# Patient Record
Sex: FEMALE | Race: WHITE | HISPANIC OR LATINO | Employment: UNEMPLOYED | URBAN - METROPOLITAN AREA
[De-identification: names, ages, dates, MRNs, and addresses within clinical notes are randomized per-mention and may not be internally consistent; named-entity substitution may affect disease eponyms.]

---

## 2018-05-25 ENCOUNTER — HOSPITAL ENCOUNTER (OUTPATIENT)
Facility: MEDICAL CENTER | Age: 23
End: 2018-05-25
Attending: NURSE PRACTITIONER

## 2018-05-25 ENCOUNTER — INITIAL PRENATAL (OUTPATIENT)
Dept: OBGYN | Facility: CLINIC | Age: 23
End: 2018-05-25
Payer: MEDICAID

## 2018-05-25 VITALS
HEIGHT: 61 IN | SYSTOLIC BLOOD PRESSURE: 112 MMHG | BODY MASS INDEX: 31.91 KG/M2 | DIASTOLIC BLOOD PRESSURE: 68 MMHG | WEIGHT: 169 LBS

## 2018-05-25 DIAGNOSIS — Z34.90 ENCOUNTER FOR SUPERVISION OF NORMAL PREGNANCY, ANTEPARTUM, UNSPECIFIED GRAVIDITY: ICD-10-CM

## 2018-05-25 DIAGNOSIS — Z34.02 ENCOUNTER FOR SUPERVISION OF NORMAL FIRST PREGNANCY IN SECOND TRIMESTER: ICD-10-CM

## 2018-05-25 DIAGNOSIS — Z34.02 ENCOUNTER FOR SUPERVISION OF NORMAL FIRST PREGNANCY IN SECOND TRIMESTER: Primary | ICD-10-CM

## 2018-05-25 LAB
APPEARANCE UR: NORMAL
BILIRUB UR STRIP-MCNC: NORMAL MG/DL
COLOR UR AUTO: NORMAL
GLUCOSE UR STRIP.AUTO-MCNC: NORMAL MG/DL
KETONES UR STRIP.AUTO-MCNC: NEGATIVE MG/DL
LEUKOCYTE ESTERASE UR QL STRIP.AUTO: NEGATIVE
NITRITE UR QL STRIP.AUTO: NEGATIVE
PH UR STRIP.AUTO: 7 [PH] (ref 5–8)
PROT UR QL STRIP: NORMAL MG/DL
RBC UR QL AUTO: NEGATIVE
SP GR UR STRIP.AUTO: 1.02
UROBILINOGEN UR STRIP-MCNC: NORMAL MG/DL

## 2018-05-25 PROCEDURE — 59401 PR NEW OB VISIT: CPT | Performed by: NURSE PRACTITIONER

## 2018-05-25 PROCEDURE — 87591 N.GONORRHOEAE DNA AMP PROB: CPT

## 2018-05-25 PROCEDURE — 81002 URINALYSIS NONAUTO W/O SCOPE: CPT | Performed by: NURSE PRACTITIONER

## 2018-05-25 PROCEDURE — 87491 CHLMYD TRACH DNA AMP PROBE: CPT

## 2018-05-25 NOTE — PROGRESS NOTES
"Subjective:      Sugar Ding is a 22 y.o. female who presents with No chief complaint on file.            HPI    ROS       Objective:     /68   Ht 1.549 m (5' 1\")   Wt 76.7 kg (169 lb)   LMP 2018   BMI 31.93 kg/m²      Physical Exam   Constitutional: She appears well-developed and well-nourished.   HENT:   Head: Normocephalic.   Eyes: Pupils are equal, round, and reactive to light.   Neck: Normal range of motion. No thyromegaly present.   Cardiovascular: Normal rate, regular rhythm and normal heart sounds.    Pulmonary/Chest: Effort normal and breath sounds normal.   Abdominal: Soft. Bowel sounds are normal.   Genitourinary: Vagina normal and uterus normal.   Neurological: She is alert.   Skin: Skin is warm and dry.   Psychiatric: She has a normal mood and affect. Her behavior is normal. Judgment and thought content normal.   Nursing note and vitals reviewed.              Assessment/Plan:     1. Encounter for supervision of normal first pregnancy in second trimester    - POCT Urinalysis  - PREG CNTR PRENATAL PN; Future  - CHLAMYDIA/GC PCR URINE OR SWAB; Future  - US-OB 2ND 3RD TRI COMPLETE; Future  - AFP TETRA; Future    2. Encounter for supervision of normal pregnancy, antepartum, unspecified       "

## 2018-05-25 NOTE — LETTER
Cystic Fibrosis Carrier Testing  Sugar Ding    The following information is about a blood test that can be done to determine if you and/or your partner carry the gene for cystic fibrosis.    WHAT IS CYSTIC FIBROSIS?  · Cystic fibrosis (CF) is an inherited disease that affects more than 25,000 American children and young adults.  · Symptoms of CF vary but include lung congestion, pneumonia, diarrhea and poor growth.  Most people with CF have severe medical problems and some die at a young age.  Others have so few symptoms they are unaware they have CF.  · CF does not affect intelligence.  · Although there is no cure for CF at this time, scientists are making progress in improving treatment and in searching for a cure.  In the past many people with CF  at a very young age.  Today, many are living into their 20’s and 30’s.    IS THERE A CHANCE MY BABY COULD HAVE CYSTIC FIBROSIS?  · You can have a child with CF even if there is no history in your family (see chart below).  · CF testing can help determine if you are a carrier and at risk to have a child with CF.  Note: if both parents are carriers, there is a 1 in 4 (25%) chance with each pregnancy that they will have a child with CF.  · Carriers have one normal CF gene and one altered CF gene.  · People with CF have two altered CF genes.  · Most people have two normal copies of the CF gene.    Approximate risk that a couple with no family history of cystic fibrosis will have a child with cystic fibrosis:    Ethnic background / Risk     couple:  1 in 2,500   couple:  1 in 15,000            couple:  1 in 8,000     American couple:  1 in 32,000     WHAT TESTING IS AVAILABLE?  · There is a blood test that can be done to find out if you or your partner is a carrier.  · It is important to understand that CF carrier testing does not detect all CF carriers.  · If the test shows that you are both CF carriers, you unborn baby can  be tested to find out if the baby has CF.    HOW MUCH DOES IT COST TO HAVE CYSTIC FIBROSIS CARRIER TESTING?  · Cost and insurance coverage for CF carrier testing vary depending upon the laboratory used and your insurance policy.  · The average cost for CF carrier testing is $300 per person.  · Your genetic counselor can provide you with more information about cystic fibrosis carrier testing.    _____  Yes, I am interested in discussing carrier testing with a genetic counselor.    _____  No, I am not interested in CF carrier testing or in receiving more information about CF carrier testing.      Client signature: ________________________________________  5/25/2018

## 2018-05-25 NOTE — PROGRESS NOTES
"S:  Sugar Ding is a 22 y.o.  who presents for her new OB exam.  She is 20w4d with and JACQUELINE of Estimated Date of Delivery: 10/8/18 by unsure LMP.  She has no complaints. No significant n/v.  She is currently not working. No heavy lifting or chemical exposure. No ER visits or previous care in this pregnancy.     desires AFP.  declines CF.  Denies VB, LOF, or cramping.  Denies dysuria, vaginal DC. Reports probable fetal movement.     Pt is single and lives with family.  Pregnancy is desired.      History reviewed. No pertinent past medical history.  Family History   Problem Relation Age of Onset   • No Known Problems Mother    • No Known Problems Father    • No Known Problems Sister    • No Known Problems Brother      Social History     Social History   • Marital status: Single     Spouse name: N/A   • Number of children: N/A   • Years of education: N/A     Occupational History   • Not on file.     Social History Main Topics   • Smoking status: Never Smoker   • Smokeless tobacco: Never Used   • Alcohol use No   • Drug use: No   • Sexual activity: Yes     Partners: Male     Birth control/ protection: Condom     Other Topics Concern   • Not on file     Social History Narrative   • No narrative on file     OB History    Para Term  AB Living   1             SAB TAB Ectopic Molar Multiple Live Births                    # Outcome Date GA Lbr Randolph/2nd Weight Sex Delivery Anes PTL Lv   1 Current                   History of HSV I or II in self or partner: no  History of Thyroid problems: no    O:    Vitals:    18 1022   BP: 112/68   Weight: 76.7 kg (169 lb)   Height: 1.549 m (5' 1\")      See Prenatal Physical.    Wet mount: none      A:   1.  IUP @ 20w4d per unsure LMP        2.  S=D        3.  See problem list below               Patient Active Problem List    Diagnosis Date Noted   • Supervision of normal pregnancy 2018         P:  1.  GC/CT done. No pap due age         2.  Prenatal labs " ordered - lab slip given including AFP        3.  Discussed PNV, diet, avoidances and adequate water intake        4.  NOB packet given        5.  Return to office in 4 wks        6.  Complete OB US first available.            No orders of the defined types were placed in this encounter.

## 2018-05-25 NOTE — PROGRESS NOTES
Pt. Here for NOB visit today.  Good   # [phone]  First prenatal care  Pt. States having some nausea.   Pharmacy verified.    AFP lab slip given today along with instructions.

## 2018-05-26 LAB
C TRACH DNA SPEC QL NAA+PROBE: NEGATIVE
N GONORRHOEA DNA SPEC QL NAA+PROBE: NEGATIVE
SPECIMEN SOURCE: NORMAL

## 2018-06-12 ENCOUNTER — APPOINTMENT (OUTPATIENT)
Dept: RADIOLOGY | Facility: IMAGING CENTER | Age: 23
End: 2018-06-12
Attending: NURSE PRACTITIONER
Payer: MEDICAID

## 2018-06-12 DIAGNOSIS — Z34.02 ENCOUNTER FOR SUPERVISION OF NORMAL FIRST PREGNANCY IN SECOND TRIMESTER: ICD-10-CM

## 2018-06-12 PROCEDURE — 76805 OB US >/= 14 WKS SNGL FETUS: CPT | Performed by: OBSTETRICS & GYNECOLOGY

## 2018-06-25 ENCOUNTER — ROUTINE PRENATAL (OUTPATIENT)
Dept: OBGYN | Facility: CLINIC | Age: 23
End: 2018-06-25

## 2018-06-25 VITALS — WEIGHT: 170 LBS | SYSTOLIC BLOOD PRESSURE: 122 MMHG | BODY MASS INDEX: 32.12 KG/M2 | DIASTOLIC BLOOD PRESSURE: 60 MMHG

## 2018-06-25 DIAGNOSIS — Z34.00 SUPERVISION OF NORMAL FIRST PREGNANCY, ANTEPARTUM: Primary | ICD-10-CM

## 2018-06-25 PROCEDURE — 90040 PR PRENATAL FOLLOW UP: CPT | Performed by: NURSE PRACTITIONER

## 2018-06-25 PROCEDURE — 90715 TDAP VACCINE 7 YRS/> IM: CPT | Performed by: NURSE PRACTITIONER

## 2018-06-25 PROCEDURE — 90471 IMMUNIZATION ADMIN: CPT | Performed by: NURSE PRACTITIONER

## 2018-06-25 NOTE — PROGRESS NOTES
Pt. Here for OB/F/U today Kick Count sheet given and explained to pt.   Good FM  Good # [phone]  Pt states no complaints.   Pharmacy verified.   Pt states will do PNP some time this week along with 1 HR GTT.  Tdap vaccine given today, right deltoid. Screening checklist reviewed with pt verified by Opal Candelario C.N.M.

## 2018-06-25 NOTE — LETTER
"Count Your Baby's Movements  Another step to a healthy delivery    Sugar Ding             Dept: [phone]    How Many Weeks Pregnant 27w3d    Date to Begin Countin18              How to use this chart    One way for your physician to keep track of your baby's health is by knowing how often the baby moves (or \"kicks\") in your womb.  You can help your physician to do this by using this chart every day.    Every day, you should see how many hours it takes for your baby to move 10 times.  Start in the morning, as soon as you get up.    · First, write down the time your baby moves until you get to 10.  · Check off one box every time your baby moves until you get to 10.  · Write down the time you finished counting in the last column.  · Total how long it took to count up all 10 movements.  · Finally, fill in the box that shows how long this took.  After counting 10 movements, you no longer have to count any more that day.  The next morning, just start counting again as soon as you get up.    What should you call a \"movement\"?  It is hard to say, because it will feel different from one mother to another and from one pregnancy to the next.  The important thing is that you count the movements the same way throughout your pregnancy.  If you have more questions, you should ask your physician.    Count carefully every day!  SAMPLE:  Week 28    How many hours did it take to feel 10 movements?       Start  Time     1     2     3     4     5     6     7     8     9     10   Finish Time   Mon 8:20 ·  ·  ·  ·  ·  ·  ·  ·  ·  ·  11:40                  Sat               Sun                 IMPORTANT: You should contact your physician if it takes more than two hours for you to feel 10 movements.  Each morning, write down the time and start to count the movements of your baby.  Keep track by checking off one box every time you feel one movement.  When you have " "felt 10 \"kicks\", write down the time you finished counting in the last column.  Then fill in the   box (over the check bethany) for the number of hours it took.  Be sure to read the complete instructions on the previous page.            "

## 2018-06-25 NOTE — PROGRESS NOTES
SUBJECTIVE:  Pt is a 23 y.o.   at 27w3d  gestation. Presents today for follow-up prenatal care. Reports no issues at this time.  Reports good  fetal movement. Denies cramping/contractions, bleeding or leaking of fluid. Denies dysuria, headaches, N/V, or other issues at this time. Generally feels well today.     OBJECTIVE:  - See prenatal vitals flow  -   Vitals:    18 1022   BP: 122/60   Weight: 77.1 kg (170 lb)      Labs - not done  US - normal fetal survey            ASSESSMENT:   - IUP at 27w3d    - S=D   -   Patient Active Problem List    Diagnosis Date Noted   • Supervision of normal pregnancy 2018         PLAN:  - S/sx pregnancy and labor warning signs vs general discomforts discussed  - Fetal movements and kick counts reviewed   - Adequate hydration reinforced  - Nutrition/exercise/vitamin education; continued PNV  - lab slips with instructions  TDAP today.

## 2018-06-27 ENCOUNTER — HOSPITAL ENCOUNTER (OUTPATIENT)
Dept: LAB | Facility: MEDICAL CENTER | Age: 23
End: 2018-06-27
Attending: NURSE PRACTITIONER
Payer: COMMERCIAL

## 2018-06-27 DIAGNOSIS — Z34.00 SUPERVISION OF NORMAL FIRST PREGNANCY, ANTEPARTUM: ICD-10-CM

## 2018-06-27 DIAGNOSIS — Z34.02 ENCOUNTER FOR SUPERVISION OF NORMAL FIRST PREGNANCY IN SECOND TRIMESTER: ICD-10-CM

## 2018-06-27 LAB
ABO GROUP BLD: NORMAL
APPEARANCE UR: ABNORMAL
BACTERIA #/AREA URNS HPF: ABNORMAL /HPF
BASOPHILS # BLD AUTO: 0.3 % (ref 0–1.8)
BASOPHILS # BLD: 0.04 K/UL (ref 0–0.12)
BILIRUB UR QL STRIP.AUTO: NEGATIVE
BLD GP AB SCN SERPL QL: NORMAL
COLOR UR: YELLOW
EOSINOPHIL # BLD AUTO: 0.06 K/UL (ref 0–0.51)
EOSINOPHIL NFR BLD: 0.5 % (ref 0–6.9)
EPI CELLS #/AREA URNS HPF: ABNORMAL /HPF
ERYTHROCYTE [DISTWIDTH] IN BLOOD BY AUTOMATED COUNT: 44.2 FL (ref 35.9–50)
GLUCOSE 1H P 50 G GLC PO SERPL-MCNC: 119 MG/DL (ref 70–139)
GLUCOSE UR STRIP.AUTO-MCNC: 250 MG/DL
HBV SURFACE AG SER QL: NEGATIVE
HCT VFR BLD AUTO: 40 % (ref 37–47)
HGB BLD-MCNC: 13.2 G/DL (ref 12–16)
HIV 1+2 AB+HIV1 P24 AG SERPL QL IA: NON REACTIVE
HYALINE CASTS #/AREA URNS LPF: ABNORMAL /LPF
IMM GRANULOCYTES # BLD AUTO: 0.08 K/UL (ref 0–0.11)
IMM GRANULOCYTES NFR BLD AUTO: 0.7 % (ref 0–0.9)
KETONES UR STRIP.AUTO-MCNC: NEGATIVE MG/DL
LEUKOCYTE ESTERASE UR QL STRIP.AUTO: ABNORMAL
LYMPHOCYTES # BLD AUTO: 1.51 K/UL (ref 1–4.8)
LYMPHOCYTES NFR BLD: 12.9 % (ref 22–41)
MCH RBC QN AUTO: 29.7 PG (ref 27–33)
MCHC RBC AUTO-ENTMCNC: 33 G/DL (ref 33.6–35)
MCV RBC AUTO: 90.1 FL (ref 81.4–97.8)
MICRO URNS: ABNORMAL
MONOCYTES # BLD AUTO: 0.71 K/UL (ref 0–0.85)
MONOCYTES NFR BLD AUTO: 6.1 % (ref 0–13.4)
NEUTROPHILS # BLD AUTO: 9.32 K/UL (ref 2–7.15)
NEUTROPHILS NFR BLD: 79.5 % (ref 44–72)
NITRITE UR QL STRIP.AUTO: NEGATIVE
NRBC # BLD AUTO: 0 K/UL
NRBC BLD-RTO: 0 /100 WBC
PH UR STRIP.AUTO: 7 [PH]
PLATELET # BLD AUTO: 254 K/UL (ref 164–446)
PMV BLD AUTO: 11.3 FL (ref 9–12.9)
PROT UR QL STRIP: NEGATIVE MG/DL
RBC # BLD AUTO: 4.44 M/UL (ref 4.2–5.4)
RBC UR QL AUTO: NEGATIVE
RH BLD: NORMAL
RUBV AB SER QL: 9.6 IU/ML
SP GR UR STRIP.AUTO: 1.02
TREPONEMA PALLIDUM IGG+IGM AB [PRESENCE] IN SERUM OR PLASMA BY IMMUNOASSAY: NON REACTIVE
UROBILINOGEN UR STRIP.AUTO-MCNC: 0.2 MG/DL
WBC # BLD AUTO: 11.7 K/UL (ref 4.8–10.8)
WBC #/AREA URNS HPF: ABNORMAL /HPF

## 2018-06-28 PROBLEM — O09.899 RUBELLA NON-IMMUNE STATUS, ANTEPARTUM: Status: ACTIVE | Noted: 2018-06-28

## 2018-06-28 PROBLEM — Z28.39 RUBELLA NON-IMMUNE STATUS, ANTEPARTUM: Status: ACTIVE | Noted: 2018-06-28
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
## 2018-07-09 ENCOUNTER — ROUTINE PRENATAL (OUTPATIENT)
Dept: OBGYN | Facility: CLINIC | Age: 23
End: 2018-07-09
Payer: MEDICAID

## 2018-07-09 VITALS — SYSTOLIC BLOOD PRESSURE: 127 MMHG | DIASTOLIC BLOOD PRESSURE: 75 MMHG | WEIGHT: 172 LBS | BODY MASS INDEX: 32.5 KG/M2

## 2018-07-09 DIAGNOSIS — Z34.90 ENCOUNTER FOR SUPERVISION OF NORMAL PREGNANCY, ANTEPARTUM, UNSPECIFIED GRAVIDITY: Primary | ICD-10-CM

## 2018-07-09 DIAGNOSIS — O09.899 RUBELLA NON-IMMUNE STATUS, ANTEPARTUM: ICD-10-CM

## 2018-07-09 DIAGNOSIS — Z28.39 RUBELLA NON-IMMUNE STATUS, ANTEPARTUM: ICD-10-CM

## 2018-07-09 PROCEDURE — 90040 PR PRENATAL FOLLOW UP: CPT | Performed by: PHYSICIAN ASSISTANT

## 2018-07-09 NOTE — LETTER
"Count Your Baby's Movements  Another step to a healthy delivery    Sugar Chatterjee             Dept: [phone]    How Many Weeks Pregnant? 29W3D    Date to Begin Counting: Today 07/09/2018              How to use this chart    One way for your physician to keep track of your baby's health is by knowing how often the baby moves (or \"kicks\") in your womb.  You can help your physician to do this by using this chart every day.    Every day, you should see how many hours it takes for your baby to move 10 times.  Start in the morning, as soon as you get up.    · First, write down the time your baby moves until you get to 10.  · Check off one box every time your baby moves until you get to 10.  · Write down the time you finished counting in the last column.  · Total how long it took to count up all 10 movements.  · Finally, fill in the box that shows how long this took.  After counting 10 movements, you no longer have to count any more that day.  The next morning, just start counting again as soon as you get up.    What should you call a \"movement\"?  It is hard to say, because it will feel different from one mother to another and from one pregnancy to the next.  The important thing is that you count the movements the same way throughout your pregnancy.  If you have more questions, you should ask your physician.    Count carefully every day!  SAMPLE:  Week 28    How many hours did it take to feel 10 movements?       Start  Time     1     2     3     4     5     6     7     8     9     10   Finish Time   Mon 8:20 ·  ·  ·  ·  ·  ·  ·  ·  ·  ·  11:40   Tue Wed Thu Fri               Sat               Sun                 IMPORTANT: You should contact your physician if it takes more than two hours for you to feel 10 movements.  Each morning, write down the time and start to count the movements of your baby.  Keep track by checking off one box every time you feel one movement.  When you " "have felt 10 \"kicks\", write down the time you finished counting in the last column.  Then fill in the   box (over the check bethany) for the number of hours it took.  Be sure to read the complete instructions on the previous page.            "

## 2018-07-09 NOTE — LETTER
"Count Your Baby's Movements  Another step to a healthy delivery    [facility]             Dept: [phone]    How Many Weeks Pregnant? 29w3d    Date to Begin Counting: today              How to use this chart    One way for your physician to keep track of your baby's health is by knowing how often the baby moves (or \"kicks\") in your womb.  You can help your physician to do this by using this chart every day.    Every day, you should see how many hours it takes for your baby to move 10 times.  Start in the morning, as soon as you get up.    · First, write down the time your baby moves until you get to 10.  · Check off one box every time your baby moves until you get to 10.  · Write down the time you finished counting in the last column.  · Total how long it took to count up all 10 movements.  · Finally, fill in the box that shows how long this took.  After counting 10 movements, you no longer have to count any more that day.  The next morning, just start counting again as soon as you get up.    What should you call a \"movement\"?  It is hard to say, because it will feel different from one mother to another and from one pregnancy to the next.  The important thing is that you count the movements the same way throughout your pregnancy.  If you have more questions, you should ask your physician.    Count carefully every day!  SAMPLE:  Week 28    How many hours did it take to feel 10 movements?       Start  Time     1     2     3     4     5     6     7     8     9     10   Finish Time   Mon 8:20 ·  ·  ·  ·  ·  ·  ·  ·  ·  ·  11:40   Tue Wed Thu Fri               Sat               Sun                 IMPORTANT: You should contact your physician if it takes more than two hours for you to feel 10 movements.  Each morning, write down the time and start to count the movements of your baby.  Keep track by checking off one box every time you feel one movement.  When you have " "felt 10 \"kicks\", write down the time you finished counting in the last column.  Then fill in the   box (over the check bethany) for the number of hours it took.  Be sure to read the complete instructions on the previous page.            "

## 2018-07-23 ENCOUNTER — ROUTINE PRENATAL (OUTPATIENT)
Dept: OBGYN | Facility: CLINIC | Age: 23
End: 2018-07-23

## 2018-07-23 VITALS — SYSTOLIC BLOOD PRESSURE: 128 MMHG | WEIGHT: 174 LBS | DIASTOLIC BLOOD PRESSURE: 68 MMHG | BODY MASS INDEX: 32.88 KG/M2

## 2018-07-23 DIAGNOSIS — Z34.00 SUPERVISION OF NORMAL FIRST PREGNANCY, ANTEPARTUM: Primary | ICD-10-CM

## 2018-07-23 DIAGNOSIS — Z28.39 RUBELLA NON-IMMUNE STATUS, ANTEPARTUM: ICD-10-CM

## 2018-07-23 DIAGNOSIS — O09.899 RUBELLA NON-IMMUNE STATUS, ANTEPARTUM: ICD-10-CM

## 2018-07-23 PROCEDURE — 90040 PR PRENATAL FOLLOW UP: CPT | Performed by: NURSE PRACTITIONER

## 2018-07-23 NOTE — PROGRESS NOTES
OB f/u. + fetal movement.  No VB, LOF or UC's.  Good phone # [phone]  Preferred pharmacy confirmed  Pt denies any problems at this time

## 2018-07-23 NOTE — PROGRESS NOTES
S) Pt is a 23 y.o.   at 31w3d  gestation. Routine prenatal care today. No complaints today. Discussed uterine size discrepancy, and need for US if next measurement is also greater than expected.  labor precautions discussed, all questions answered.    Fetal movement Normal  Cramping no  VB no  LOF no   Denies dysuria. Generally feels well today. Good self-care activities identified. Denies headaches, swelling, visual changes, or epigastric pain .     O) Blood pressure 128/68, weight 78.9 kg (174 lb), last menstrual period 2018.        Labs:       PNL: WNL       GCT: 119       AFP: Not Examined       GBS: N/A       Pertinent ultrasound -        18- Survey WNL, CLARE 19.20cm, JACQUELINE changed based on this US as it was >2weeks different from LMP. Patient aware of changed JACQUELINE    A) IUP at 31w3d       S>D, consider US next visit if still larger than expected         Patient Active Problem List    Diagnosis Date Noted   • Rubella equivocal  2018   • Supervision of normal pregnancy 2018          SVE: deferred         TDAP: yes       FLU: no        BTL: no       : n/a       C/S Consent: n/a       IOL or C/S scheduled: no       LAST PAP: deferred due to age, will do PP         P) s/s ptl vs general discomforts. Fetal movements reviewed. General ed and anticipatory guidance. Nutrition/exercise/vitamin. Plans breast Plans pp contraception- unsure  Continue PNV.

## 2018-08-06 ENCOUNTER — ROUTINE PRENATAL (OUTPATIENT)
Dept: OBGYN | Facility: CLINIC | Age: 23
End: 2018-08-06

## 2018-08-06 VITALS — BODY MASS INDEX: 33.63 KG/M2 | DIASTOLIC BLOOD PRESSURE: 75 MMHG | WEIGHT: 178 LBS | SYSTOLIC BLOOD PRESSURE: 137 MMHG

## 2018-08-06 DIAGNOSIS — Z28.39 RUBELLA NON-IMMUNE STATUS, ANTEPARTUM: ICD-10-CM

## 2018-08-06 DIAGNOSIS — O09.899 RUBELLA NON-IMMUNE STATUS, ANTEPARTUM: ICD-10-CM

## 2018-08-06 PROCEDURE — 90040 PR PRENATAL FOLLOW UP: CPT | Performed by: PHYSICIAN ASSISTANT

## 2018-08-06 NOTE — PROGRESS NOTES
S:  Pt is  at 33w3d for Centering session #5.  Pt has no complaints.  Reports good FM.  Denies VB, LOF, RUCs or vaginal DC.    O:  Please see above vitals.        FHTs: 160        Fundal ht: 34        1hr      A:  IUP at 33w3d  Patient Active Problem List    Diagnosis Date Noted   • Rubella equivocal  2018   • Supervision of normal pregnancy 2018        P:  1.  Declines BTL.          2.  Questions answered.          3.  Encouraged adequate water intake        4.  F/u 2wks Centering Session #6        5.  Continue FKCs.    Centering Topics Reviewed:  1.  Labor  2.  Birth facility  3.  Breathing  4.  Medications for labor & birth  5.  Early labor -- when to call  6.  Kick counts

## 2018-08-20 ENCOUNTER — ROUTINE PRENATAL (OUTPATIENT)
Dept: OBGYN | Facility: CLINIC | Age: 23
End: 2018-08-20

## 2018-08-20 ENCOUNTER — HOSPITAL ENCOUNTER (OUTPATIENT)
Facility: MEDICAL CENTER | Age: 23
End: 2018-08-20
Attending: NURSE PRACTITIONER
Payer: COMMERCIAL

## 2018-08-20 VITALS — SYSTOLIC BLOOD PRESSURE: 120 MMHG | WEIGHT: 182 LBS | BODY MASS INDEX: 34.39 KG/M2 | DIASTOLIC BLOOD PRESSURE: 83 MMHG

## 2018-08-20 DIAGNOSIS — O09.899 RUBELLA NON-IMMUNE STATUS, ANTEPARTUM: ICD-10-CM

## 2018-08-20 DIAGNOSIS — Z28.39 RUBELLA NON-IMMUNE STATUS, ANTEPARTUM: ICD-10-CM

## 2018-08-20 DIAGNOSIS — Z34.03 ENCOUNTER FOR SUPERVISION OF NORMAL FIRST PREGNANCY IN THIRD TRIMESTER: ICD-10-CM

## 2018-08-20 DIAGNOSIS — Z34.03 ENCOUNTER FOR SUPERVISION OF NORMAL FIRST PREGNANCY IN THIRD TRIMESTER: Primary | ICD-10-CM

## 2018-08-20 PROCEDURE — 90040 PR PRENATAL FOLLOW UP: CPT | Performed by: NURSE PRACTITIONER

## 2018-08-20 NOTE — PROGRESS NOTES
S:  Pt is  at 35w3d for Centering session #6.  No c/o today.  Reports good FM.  Denies VB, LOF, RUCs or vaginal DC.    O:  Please see above vitals.        FHTs: 147        Fundal ht: 36          A:  IUP at 35w3d  Patient Active Problem List    Diagnosis Date Noted   • Rubella equivocal  2018   • Supervision of normal pregnancy 2018        P:  1.  Questions answered.          2.  Encouraged adequate water intake        3.  F/u 2wks Centering Session #7        4.  Continue FKCs.          5.  PP contraception pills        6.  GBS obtained.      Centering Topics Reviewed:  1.  The Birth Experience

## 2018-08-20 NOTE — PATIENT INSTRUCTIONS
P:  1.  Questions answered.          2.  Encouraged adequate water intake        3.  F/u 2wks Centering Session #7        4.  Continue FKCs.          5.  PP contraception pills        6.  GBS obtained.      Centering Topics Reviewed:  1.  The Birth Experience

## 2018-08-22 LAB — GP B STREP DNA SPEC QL NAA+PROBE: NEGATIVE

## 2018-08-29 NOTE — PROGRESS NOTES
S:  Pt is  at 37w4d for Centering session #7.  Pt has no complaints, though has felt more pressure in lower abd.  Reports good FM.  Denies VB, LOF, RUCs or vaginal DC. Pt denies HA, blurred vision or edema.   O:  Please see above vitals.        FHTs: 142        Fundal ht: 38        BP: 146/92, repeat 132/84, US: Neg protein, no edema    A:  IUP at 37w4d  Patient Active Problem List    Diagnosis Date Noted   • Rubella equivocal  2018   • Supervision of normal pregnancy 2018        P:  1.  Questions answered.          2.  Encouraged adequate water intake        3.  GBS @ 35wks - neg, pt notified of results        4.  F/u 2wks Centering Session #8, 1 wk ob f/u    Centering Topics Reviewed:  1.  The 's first days  2.  Planning pediatric care  3.  Caring for your baby  4.  Circumcision  5.  Brothers & sisters  6.   -- when to call  7.  Other topics: Pain control in labor

## 2018-09-04 ENCOUNTER — ROUTINE PRENATAL (OUTPATIENT)
Dept: OBGYN | Facility: CLINIC | Age: 23
End: 2018-09-04

## 2018-09-04 VITALS — BODY MASS INDEX: 35.71 KG/M2 | SYSTOLIC BLOOD PRESSURE: 132 MMHG | DIASTOLIC BLOOD PRESSURE: 84 MMHG | WEIGHT: 189 LBS

## 2018-09-04 DIAGNOSIS — O09.899 RUBELLA NON-IMMUNE STATUS, ANTEPARTUM: ICD-10-CM

## 2018-09-04 DIAGNOSIS — R03.0 ELEVATED BP WITHOUT DIAGNOSIS OF HYPERTENSION: ICD-10-CM

## 2018-09-04 DIAGNOSIS — Z28.39 RUBELLA NON-IMMUNE STATUS, ANTEPARTUM: ICD-10-CM

## 2018-09-04 LAB
APPEARANCE UR: NORMAL
BILIRUB UR STRIP-MCNC: NORMAL MG/DL
COLOR UR AUTO: YELLOW
GLUCOSE UR STRIP.AUTO-MCNC: NEGATIVE MG/DL
KETONES UR STRIP.AUTO-MCNC: NEGATIVE MG/DL
LEUKOCYTE ESTERASE UR QL STRIP.AUTO: NORMAL
NITRITE UR QL STRIP.AUTO: NEGATIVE
PH UR STRIP.AUTO: 6.5 [PH] (ref 5–8)
PROT UR QL STRIP: NEGATIVE MG/DL
RBC UR QL AUTO: NEGATIVE
SP GR UR STRIP.AUTO: 1.01
UROBILINOGEN UR STRIP-MCNC: NORMAL MG/DL

## 2018-09-04 PROCEDURE — 90040 PR PRENATAL FOLLOW UP: CPT | Performed by: PHYSICIAN ASSISTANT

## 2018-09-04 PROCEDURE — 81002 URINALYSIS NONAUTO W/O SCOPE: CPT | Performed by: PHYSICIAN ASSISTANT

## 2018-09-14 ENCOUNTER — ROUTINE PRENATAL (OUTPATIENT)
Dept: OBGYN | Facility: CLINIC | Age: 23
End: 2018-09-14

## 2018-09-14 VITALS — WEIGHT: 195 LBS | SYSTOLIC BLOOD PRESSURE: 130 MMHG | DIASTOLIC BLOOD PRESSURE: 78 MMHG | BODY MASS INDEX: 36.84 KG/M2

## 2018-09-14 DIAGNOSIS — Z34.03 ENCOUNTER FOR SUPERVISION OF NORMAL FIRST PREGNANCY IN THIRD TRIMESTER: Primary | ICD-10-CM

## 2018-09-14 DIAGNOSIS — Z28.39 RUBELLA NON-IMMUNE STATUS, ANTEPARTUM: ICD-10-CM

## 2018-09-14 DIAGNOSIS — O09.899 RUBELLA NON-IMMUNE STATUS, ANTEPARTUM: ICD-10-CM

## 2018-09-14 PROCEDURE — 90040 PR PRENATAL FOLLOW UP: CPT | Performed by: NURSE PRACTITIONER

## 2018-09-14 NOTE — PROGRESS NOTES
S) Pt is a 23 y.o.   at 39w0d  gestation. Routine prenatal care today. Pt denies complaints other than some pelvic pressure.  Reassurance provided.    Fetal movement Normal  Cramping no  VB no  LOF no   Denies dysuria. Generally feels well today. Good self-care activities identified. Denies headaches, swelling, visual changes, or epigastric pain .     O) Blood pressure 130/78, weight 88.5 kg (195 lb), last menstrual period 2018.        Labs:       PNL: WNL       GCT: 119       AFP: Not Examined       GBS: negative       Pertinent ultrasound - 18 survey WNL, CLARE 19.2, consistent with previous dating           A) IUP at 39w0d       S=D         Patient Active Problem List    Diagnosis Date Noted   • Rubella equivocal  2018   • Supervision of normal pregnancy 2018          SVE: pt refused         TDAP: yes       FLU: no        BTL: no       : no       C/S Consent: no       IOL or C/S scheduled: yes - referral placed       LAST PAP: will need first one PP         P) Reviewed labour precautions   Fetal movements reviewed. General ed and anticipatory guidance. Nutrition/exercise/vitamin.   Plans breast Plans pp contraception- unsure    Discussed GBS results  Discussed practice IOL rec at 41 weeks  RTC 1 week or PRN.

## 2018-09-14 NOTE — PROGRESS NOTES
Pt here today for OB follow up  Pt states she is having pelvic pain  Reports +FM  Good # [phone]  Pharmacy Confirmed.  Chaperone offered and not idicated. .  GBS negative.

## 2018-09-16 NOTE — PROGRESS NOTES
S:  Pt is  at 39w3d for Centering session #8.  Reports occ UC.  Reports good FM.  Denies VB, LOF, RUCs or vaginal DC.    O:  Please see above vitals.        FHTs: 140        Fundal ht: 38        Fetal position: vertex        SVE: cl/th/-2        GBS neg on 18.          A:  IUP at 39w3d  Patient Active Problem List    Diagnosis Date Noted   • Rubella equivocal  2018   • Supervision of normal pregnancy 2018        P:  1.  Questions answered.          2.  Encouraged adequate water intake        3.  Reviewed GBS status w pt.        4.  F/u 1wk LYNNE & 2wks Centering Session #9/10        5.  IOL pending.         6.  PP contraception: depo-provera.     Centering Topics Reviewed:  1.  Pregnancy to parenting transition  2.  Emotional adjustments  3.  Postpartum depression  4.  Pregnancy -- when to call  5.  Breastfeeding.    Chaperone offered and provided by Sridevi Landaverde MA.

## 2018-09-17 ENCOUNTER — ROUTINE PRENATAL (OUTPATIENT)
Dept: OBGYN | Facility: CLINIC | Age: 23
End: 2018-09-17

## 2018-09-17 VITALS — SYSTOLIC BLOOD PRESSURE: 138 MMHG | WEIGHT: 196 LBS | BODY MASS INDEX: 37.03 KG/M2 | DIASTOLIC BLOOD PRESSURE: 76 MMHG

## 2018-09-17 DIAGNOSIS — Z34.03 ENCOUNTER FOR SUPERVISION OF NORMAL FIRST PREGNANCY IN THIRD TRIMESTER: Primary | ICD-10-CM

## 2018-09-17 DIAGNOSIS — O09.899 RUBELLA NON-IMMUNE STATUS, ANTEPARTUM: ICD-10-CM

## 2018-09-17 DIAGNOSIS — Z28.39 RUBELLA NON-IMMUNE STATUS, ANTEPARTUM: ICD-10-CM

## 2018-09-17 PROCEDURE — 90040 PR PRENATAL FOLLOW UP: CPT | Performed by: NURSE PRACTITIONER

## 2018-09-17 NOTE — PATIENT INSTRUCTIONS
P:  1.  Questions answered.          2.  Encouraged adequate water intake        3.  Reviewed GBS status w pt.        4.  F/u 1wk LYNNE & 2wks Centering Session #9/10        5.  IOL pending.         6.  PP contraception: depo-provera.      Centering Topics Reviewed:  1.  Pregnancy to parenting transition  2.  Emotional adjustments  3.  Postpartum depression  4.  Pregnancy -- when to call  5.  Breastfeeding.

## 2018-09-25 ENCOUNTER — ROUTINE PRENATAL (OUTPATIENT)
Dept: OBGYN | Facility: CLINIC | Age: 23
End: 2018-09-25

## 2018-09-25 ENCOUNTER — HOSPITAL ENCOUNTER (INPATIENT)
Facility: MEDICAL CENTER | Age: 23
LOS: 3 days | End: 2018-09-28
Attending: OBSTETRICS & GYNECOLOGY | Admitting: OBSTETRICS & GYNECOLOGY
Payer: MEDICAID

## 2018-09-25 VITALS — BODY MASS INDEX: 37.03 KG/M2 | DIASTOLIC BLOOD PRESSURE: 92 MMHG | SYSTOLIC BLOOD PRESSURE: 128 MMHG | WEIGHT: 196 LBS

## 2018-09-25 DIAGNOSIS — R03.0 ELEVATED BP WITHOUT DIAGNOSIS OF HYPERTENSION: ICD-10-CM

## 2018-09-25 LAB
ALBUMIN SERPL BCP-MCNC: 3.1 G/DL (ref 3.2–4.9)
ALBUMIN/GLOB SERPL: 0.8 G/DL
ALP SERPL-CCNC: 171 U/L (ref 30–99)
ALT SERPL-CCNC: 9 U/L (ref 2–50)
ANION GAP SERPL CALC-SCNC: 10 MMOL/L (ref 0–11.9)
APPEARANCE UR: CLEAR
APPEARANCE UR: NORMAL
AST SERPL-CCNC: 15 U/L (ref 12–45)
BASOPHILS # BLD AUTO: 0.2 % (ref 0–1.8)
BASOPHILS # BLD: 0.03 K/UL (ref 0–0.12)
BILIRUB SERPL-MCNC: 0.4 MG/DL (ref 0.1–1.5)
BILIRUB UR STRIP-MCNC: NORMAL MG/DL
BUN SERPL-MCNC: 10 MG/DL (ref 8–22)
CALCIUM SERPL-MCNC: 9.3 MG/DL (ref 8.5–10.5)
CHLORIDE SERPL-SCNC: 106 MMOL/L (ref 96–112)
CO2 SERPL-SCNC: 21 MMOL/L (ref 20–33)
COLOR UR AUTO: NORMAL
COLOR UR AUTO: YELLOW
CREAT SERPL-MCNC: 0.66 MG/DL (ref 0.5–1.4)
EOSINOPHIL # BLD AUTO: 0.03 K/UL (ref 0–0.51)
EOSINOPHIL NFR BLD: 0.2 % (ref 0–6.9)
ERYTHROCYTE [DISTWIDTH] IN BLOOD BY AUTOMATED COUNT: 45.1 FL (ref 35.9–50)
GLOBULIN SER CALC-MCNC: 3.8 G/DL (ref 1.9–3.5)
GLUCOSE SERPL-MCNC: 81 MG/DL (ref 65–99)
GLUCOSE UR QL STRIP.AUTO: NEGATIVE MG/DL
GLUCOSE UR STRIP.AUTO-MCNC: NEGATIVE MG/DL
HCT VFR BLD AUTO: 35.6 % (ref 37–47)
HGB BLD-MCNC: 11.8 G/DL (ref 12–16)
HOLDING TUBE BB 8507: NORMAL
IMM GRANULOCYTES # BLD AUTO: 0.08 K/UL (ref 0–0.11)
IMM GRANULOCYTES NFR BLD AUTO: 0.6 % (ref 0–0.9)
KETONES UR QL STRIP.AUTO: NEGATIVE MG/DL
KETONES UR STRIP.AUTO-MCNC: NORMAL MG/DL
LEUKOCYTE ESTERASE UR QL STRIP.AUTO: ABNORMAL
LEUKOCYTE ESTERASE UR QL STRIP.AUTO: NEGATIVE
LYMPHOCYTES # BLD AUTO: 1.94 K/UL (ref 1–4.8)
LYMPHOCYTES NFR BLD: 15.2 % (ref 22–41)
MAGNESIUM SERPL-MCNC: 4.5 MG/DL (ref 1.5–2.5)
MCH RBC QN AUTO: 28 PG (ref 27–33)
MCHC RBC AUTO-ENTMCNC: 33.1 G/DL (ref 33.6–35)
MCV RBC AUTO: 84.4 FL (ref 81.4–97.8)
MONOCYTES # BLD AUTO: 0.75 K/UL (ref 0–0.85)
MONOCYTES NFR BLD AUTO: 5.9 % (ref 0–13.4)
NEUTROPHILS # BLD AUTO: 9.91 K/UL (ref 2–7.15)
NEUTROPHILS NFR BLD: 77.9 % (ref 44–72)
NITRITE UR QL STRIP.AUTO: NEGATIVE
NITRITE UR QL STRIP.AUTO: NEGATIVE
NRBC # BLD AUTO: 0.02 K/UL
NRBC BLD-RTO: 0.2 /100 WBC
NST ACOUSTIC STIMULATION: NORMAL
NST ACTION NECESSARY: NORMAL
NST ASSESSMENT: NORMAL
NST BASELINE: NORMAL
NST INDICATIONS: NORMAL
NST OTHER DATA: NORMAL
NST READ BY: NORMAL
NST RETURN: NORMAL
NST UTERINE ACTIVITY: NORMAL
PH UR STRIP.AUTO: 6 [PH]
PH UR STRIP.AUTO: 6 [PH] (ref 5–8)
PLATELET # BLD AUTO: 245 K/UL (ref 164–446)
PMV BLD AUTO: 12.4 FL (ref 9–12.9)
POTASSIUM SERPL-SCNC: 3.8 MMOL/L (ref 3.6–5.5)
PROT SERPL-MCNC: 6.9 G/DL (ref 6–8.2)
PROT UR QL STRIP: 300 MG/DL
PROT UR QL STRIP: >=300 MG/DL
RBC # BLD AUTO: 4.22 M/UL (ref 4.2–5.4)
RBC UR QL AUTO: ABNORMAL
RBC UR QL AUTO: NORMAL
SODIUM SERPL-SCNC: 137 MMOL/L (ref 135–145)
SP GR UR STRIP.AUTO: 1.02
SP GR UR: 1.02
URATE SERPL-MCNC: 5.9 MG/DL (ref 1.9–8.2)
UROBILINOGEN UR STRIP-MCNC: NORMAL MG/DL
WBC # BLD AUTO: 12.7 K/UL (ref 4.8–10.8)

## 2018-09-25 PROCEDURE — 90040 PR PRENATAL FOLLOW UP: CPT | Performed by: NURSE PRACTITIONER

## 2018-09-25 PROCEDURE — 80053 COMPREHEN METABOLIC PANEL: CPT

## 2018-09-25 PROCEDURE — 770002 HCHG ROOM/CARE - OB PRIVATE (112)

## 2018-09-25 PROCEDURE — 10907ZC DRAINAGE OF AMNIOTIC FLUID, THERAPEUTIC FROM PRODUCTS OF CONCEPTION, VIA NATURAL OR ARTIFICIAL OPENING: ICD-10-PCS | Performed by: OBSTETRICS & GYNECOLOGY

## 2018-09-25 PROCEDURE — 700101 HCHG RX REV CODE 250

## 2018-09-25 PROCEDURE — 700111 HCHG RX REV CODE 636 W/ 250 OVERRIDE (IP)

## 2018-09-25 PROCEDURE — 85025 COMPLETE CBC W/AUTO DIFF WBC: CPT

## 2018-09-25 PROCEDURE — 81002 URINALYSIS NONAUTO W/O SCOPE: CPT

## 2018-09-25 PROCEDURE — 84550 ASSAY OF BLOOD/URIC ACID: CPT

## 2018-09-25 PROCEDURE — 36415 COLL VENOUS BLD VENIPUNCTURE: CPT

## 2018-09-25 PROCEDURE — 81002 URINALYSIS NONAUTO W/O SCOPE: CPT | Performed by: NURSE PRACTITIONER

## 2018-09-25 PROCEDURE — 700105 HCHG RX REV CODE 258

## 2018-09-25 PROCEDURE — 700111 HCHG RX REV CODE 636 W/ 250 OVERRIDE (IP): Performed by: STUDENT IN AN ORGANIZED HEALTH CARE EDUCATION/TRAINING PROGRAM

## 2018-09-25 PROCEDURE — 83735 ASSAY OF MAGNESIUM: CPT

## 2018-09-25 RX ORDER — CALCIUM GLUCONATE 94 MG/ML
1 INJECTION, SOLUTION INTRAVENOUS
Status: DISCONTINUED | OUTPATIENT
Start: 2018-09-25 | End: 2018-09-26 | Stop reason: HOSPADM

## 2018-09-25 RX ORDER — SODIUM CHLORIDE, SODIUM LACTATE, POTASSIUM CHLORIDE, CALCIUM CHLORIDE 600; 310; 30; 20 MG/100ML; MG/100ML; MG/100ML; MG/100ML
INJECTION, SOLUTION INTRAVENOUS
Status: COMPLETED
Start: 2018-09-25 | End: 2018-09-25

## 2018-09-25 RX ORDER — ROPIVACAINE HYDROCHLORIDE 2 MG/ML
INJECTION, SOLUTION EPIDURAL; INFILTRATION; PERINEURAL CONTINUOUS
Status: DISCONTINUED | OUTPATIENT
Start: 2018-09-25 | End: 2018-09-28 | Stop reason: HOSPADM

## 2018-09-25 RX ORDER — SODIUM CHLORIDE, SODIUM LACTATE, POTASSIUM CHLORIDE, AND CALCIUM CHLORIDE .6; .31; .03; .02 G/100ML; G/100ML; G/100ML; G/100ML
250 INJECTION, SOLUTION INTRAVENOUS PRN
Status: DISCONTINUED | OUTPATIENT
Start: 2018-09-25 | End: 2018-09-26 | Stop reason: HOSPADM

## 2018-09-25 RX ORDER — BUPIVACAINE HYDROCHLORIDE 2.5 MG/ML
INJECTION, SOLUTION EPIDURAL; INFILTRATION; INTRACAUDAL
Status: COMPLETED
Start: 2018-09-25 | End: 2018-09-25

## 2018-09-25 RX ORDER — ROPIVACAINE HYDROCHLORIDE 2 MG/ML
INJECTION, SOLUTION EPIDURAL; INFILTRATION; PERINEURAL
Status: COMPLETED
Start: 2018-09-25 | End: 2018-09-25

## 2018-09-25 RX ORDER — MAGNESIUM SULFATE HEPTAHYDRATE 40 MG/ML
INJECTION, SOLUTION INTRAVENOUS
Status: ACTIVE
Start: 2018-09-25 | End: 2018-09-26

## 2018-09-25 RX ORDER — MAGNESIUM SULFATE HEPTAHYDRATE 40 MG/ML
4 INJECTION, SOLUTION INTRAVENOUS ONCE
Status: COMPLETED | OUTPATIENT
Start: 2018-09-25 | End: 2018-09-25

## 2018-09-25 RX ORDER — MAGNESIUM SULFATE HEPTAHYDRATE 40 MG/ML
2 INJECTION, SOLUTION INTRAVENOUS CONTINUOUS
Status: DISCONTINUED | OUTPATIENT
Start: 2018-09-25 | End: 2018-09-28 | Stop reason: HOSPADM

## 2018-09-25 RX ORDER — ONDANSETRON 2 MG/ML
4 INJECTION INTRAMUSCULAR; INTRAVENOUS EVERY 6 HOURS PRN
Status: DISCONTINUED | OUTPATIENT
Start: 2018-09-25 | End: 2018-09-28 | Stop reason: HOSPADM

## 2018-09-25 RX ORDER — MAGNESIUM SULFATE HEPTAHYDRATE 40 MG/ML
INJECTION, SOLUTION INTRAVENOUS
Status: COMPLETED
Start: 2018-09-25 | End: 2018-09-25

## 2018-09-25 RX ORDER — ONDANSETRON 4 MG/1
4 TABLET, ORALLY DISINTEGRATING ORAL EVERY 6 HOURS PRN
Status: DISCONTINUED | OUTPATIENT
Start: 2018-09-25 | End: 2018-09-28 | Stop reason: HOSPADM

## 2018-09-25 RX ORDER — HYDRALAZINE HYDROCHLORIDE 20 MG/ML
5-10 INJECTION INTRAMUSCULAR; INTRAVENOUS PRN
Status: DISCONTINUED | OUTPATIENT
Start: 2018-09-25 | End: 2018-09-26 | Stop reason: HOSPADM

## 2018-09-25 RX ORDER — LABETALOL HYDROCHLORIDE 5 MG/ML
20-80 INJECTION, SOLUTION INTRAVENOUS PRN
Status: DISCONTINUED | OUTPATIENT
Start: 2018-09-25 | End: 2018-09-26 | Stop reason: HOSPADM

## 2018-09-25 RX ORDER — SODIUM CHLORIDE, SODIUM LACTATE, POTASSIUM CHLORIDE, AND CALCIUM CHLORIDE .6; .31; .03; .02 G/100ML; G/100ML; G/100ML; G/100ML
1000 INJECTION, SOLUTION INTRAVENOUS
Status: COMPLETED | OUTPATIENT
Start: 2018-09-25 | End: 2018-09-25

## 2018-09-25 RX ORDER — ACETAMINOPHEN 325 MG/1
325 TABLET ORAL EVERY 4 HOURS PRN
Status: DISCONTINUED | OUTPATIENT
Start: 2018-09-25 | End: 2018-09-26

## 2018-09-25 RX ADMIN — Medication 1 MILLI-UNITS/MIN: at 16:05

## 2018-09-25 RX ADMIN — ROPIVACAINE HYDROCHLORIDE 100 ML: 2 INJECTION, SOLUTION EPIDURAL; INFILTRATION at 19:15

## 2018-09-25 RX ADMIN — FENTANYL CITRATE 100 MCG: 50 INJECTION INTRAMUSCULAR; INTRAVENOUS at 16:52

## 2018-09-25 RX ADMIN — MAGNESIUM SULFATE IN WATER 4 G: 40 INJECTION, SOLUTION INTRAVENOUS at 15:48

## 2018-09-25 RX ADMIN — FENTANYL CITRATE: 50 INJECTION, SOLUTION INTRAMUSCULAR; INTRAVENOUS at 19:00

## 2018-09-25 RX ADMIN — SODIUM CHLORIDE, POTASSIUM CHLORIDE, SODIUM LACTATE AND CALCIUM CHLORIDE 1000 ML: 600; 310; 30; 20 INJECTION, SOLUTION INTRAVENOUS at 15:48

## 2018-09-25 RX ADMIN — SODIUM CHLORIDE, SODIUM LACTATE, POTASSIUM CHLORIDE, AND CALCIUM CHLORIDE 1000 ML: .6; .31; .03; .02 INJECTION, SOLUTION INTRAVENOUS at 15:48

## 2018-09-25 RX ADMIN — MAGNESIUM SULFATE IN WATER 20 G: 40 INJECTION, SOLUTION INTRAVENOUS at 16:13

## 2018-09-25 RX ADMIN — BUPIVACAINE HYDROCHLORIDE: 2.5 INJECTION, SOLUTION EPIDURAL; INFILTRATION; INTRACAUDAL; PERINEURAL at 19:00

## 2018-09-25 RX ADMIN — SODIUM CHLORIDE, POTASSIUM CHLORIDE, SODIUM LACTATE AND CALCIUM CHLORIDE 1000 ML: 600; 310; 30; 20 INJECTION, SOLUTION INTRAVENOUS at 18:14

## 2018-09-25 ASSESSMENT — COPD QUESTIONNAIRES
IN THE PAST 12 MONTHS DO YOU DO LESS THAN YOU USED TO BECAUSE OF YOUR BREATHING PROBLEMS: DISAGREE/UNSURE
DO YOU EVER COUGH UP ANY MUCUS OR PHLEGM?: NO/ONLY WITH OCCASIONAL COLDS OR INFECTIONS
COPD SCREENING SCORE: 0
DURING THE PAST 4 WEEKS HOW MUCH DID YOU FEEL SHORT OF BREATH: NONE/LITTLE OF THE TIME
HAVE YOU SMOKED AT LEAST 100 CIGARETTES IN YOUR ENTIRE LIFE: NO/DON'T KNOW

## 2018-09-25 ASSESSMENT — LIFESTYLE VARIABLES
EVER_SMOKED: NEVER
ALCOHOL_USE: NO

## 2018-09-25 ASSESSMENT — PATIENT HEALTH QUESTIONNAIRE - PHQ9
SUM OF ALL RESPONSES TO PHQ9 QUESTIONS 1 AND 2: 0
2. FEELING DOWN, DEPRESSED, IRRITABLE, OR HOPELESS: NOT AT ALL
1. LITTLE INTEREST OR PLEASURE IN DOING THINGS: NOT AT ALL

## 2018-09-25 NOTE — PROGRESS NOTES
SUBJECTIVE:  Pt is a 23 y.o.   at 40w4d  gestation. Presents today for follow-up prenatal care. Reports feeling possible leaking of fluid since Saturday. States feeling headache, blurred vision and heart racing. Having UC's reported every 20 minutes.  Reports good  fetal movement.     OBJECTIVE:  - See prenatal vitals flow  -   Vitals:    18 1325   BP: 128/92   Weight: 88.9 kg (196 lb)      Fern neg, valsalva neg, nitrazine positive  UA clean catch 3 + proteinuria  Cx 3/80/-1  Reflexes - brisk 3+             ASSESSMENT:   - IUP at 40w4d    - S=D   -   Patient Active Problem List    Diagnosis Date Noted   • Rubella equivocal  2018   • Supervision of normal pregnancy 2018         PLAN:  - S/sx pregnancy and labor warning signs vs general discomforts discussed  - Fetal movements and kick counts reviewed   - Adequate hydration reinforced  - Nutrition/exercise/vitamin education; continued PNV  -  NST blood pressures are 157/102, 177/101. NST not completed. Transfer by mother to hospital.

## 2018-09-25 NOTE — PROGRESS NOTES
Per Ruby ok to DC patient before the 20 min strip. AO hypertension in pregnancy and 300+ protein.

## 2018-09-25 NOTE — PROGRESS NOTES
1535: Report received from Rajesh RN. POC discussed with Pt and mother. Dr. Villaseñor at the bedside for AROM. SVE shows cervix is 5/90/-2 per Dr. Villaseñor's exam. 1548: 4g Mag Sulfate bolus started.  1610: Bolus complete, Pt tolerated bolus well. Mag Sulfate infusion started as well as pitocin. 1825: Pt tolerating Mag Sulfate well at this time. Bolus complete for epidural.

## 2018-09-25 NOTE — PROGRESS NOTES
Pt here today for OB follow up  Pt states cx's, LOF since 9/22/18 some odor, pt also has had a HA, with blurred vision, heart racing  Reports +  Good # [phone]  Pharmacy Confirmed.  Chaperone offered and present.

## 2018-09-25 NOTE — CARE PLAN
Problem: Pain  Goal: Alleviation of Pain or a reduction in pain to the patient's comfort goal    Intervention: Initial pain assessment  Pt reports pain with UC's and plans on an epidural later into her labor. Education about bolus, procedure, and timing provided.       Problem: Risk for Infection, Impaired Wound Healing  Goal: Remain free from signs and symptoms of infection  No S/S of infection at this time. Pt is afebrile, WBC's are WNL, MEWS is WNL.

## 2018-09-25 NOTE — H&P
"History and Physical      Sugar Ding is a 23 y.o. year old female  at 40w4d dated by US who presents for elevated blood pressures in office highest of 177 systolic and 101 diastolic.    PNC with the TPC starting at 20w.     Subjective:   Positive For CTXS every 10 minutes.   Positive Feels pain though more discomfort with contractions  negative for vaginal bleeding.   Positive for fetal movement    ROS: Patient denies fever, chills, nausea, vomiting , headache, visual disturbance, or dysuria. Denies any R shoulder or RUQ pain. No decrease in urine output prior to today, no LE swelling recently.    History reviewed. No pertinent past medical history.  History reviewed. No pertinent surgical history.  OB History    Para Term  AB Living   1             SAB TAB Ectopic Molar Multiple Live Births                    # Outcome Date GA Lbr [place]/2nd Weight Sex Delivery Anes PTL Lv   1 Current                 Social History     Social History   • Marital status: Single     Spouse name: N/A   • Number of children: N/A   • Years of education: N/A     Occupational History   • Not on file.     Social History Main Topics   • Smoking status: Never Smoker   • Smokeless tobacco: Never Used   • Alcohol use No   • Drug use: No   • Sexual activity: Yes     Partners: Male     Birth control/ protection: Injection      Comment: Plans depo PP.     Other Topics Concern   • Not on file     Social History Narrative   • No narrative on file     Allergies: Patient has no known allergies.  No current facility-administered medications on file prior to encounter.      No current outpatient prescriptions on file prior to encounter.         Objective:      Blood pressure 151/107, pulse 96, temperature 36.8 °C (98.3 °F), temperature source Temporal, resp. rate 18, height 1.549 m (5' 1\"), weight 88.9 kg (196 lb), last menstrual period 2018.    General: Afebrile, NAD  Lungs: CTABL  Heart: RRR, high flow murmur " present, no rubs or gallops  Abdomen: gravid, nontender, no tenderness to palpation in RUQ  Skin: No rash  Extremities: no peripheral edema, pulses 2+ bilat peripherally  FHRT: category I  Presentation: vertex  Cervix /-2      Lab Review  Lab:   Blood type: O    Hgb: non-reactive   HIV: non-reactive   GC: negative   Chlamydia: negative    Rubella: immune    GBS: negative  1 hr GTT: negative    Recent Labs      18   1123   ABOGROUP  O   RUBELLAIGG  9.60   HEPBSAG  Negative     CBC and CMP pending        Assessment/Plan:   Sugar Ding is a 23 y.o. year old female  at 40w4d dated by 6 mo US who presents for hypertension of pregnancy wo/ diagnosis of preeclampsia.      - Admit to L&D  - Anticipate  with induction of labor  - Will start Mg2+ and monitor BPs closely, labetalol per HTN protocol PRN  - insert hancock, strict I/Os with Mg2+  - IOL with pitocin  - GBS negative, PNL wnl, CBC and CMP pending and will f/u

## 2018-09-26 LAB
MAGNESIUM SERPL-MCNC: 6.3 MG/DL (ref 1.5–2.5)
MAGNESIUM SERPL-MCNC: 6.4 MG/DL (ref 1.5–2.5)
MAGNESIUM SERPL-MCNC: 6.8 MG/DL (ref 1.5–2.5)
MAGNESIUM SERPL-MCNC: 6.8 MG/DL (ref 1.5–2.5)

## 2018-09-26 PROCEDURE — 303615 HCHG EPIDURAL/SPINAL ANESTHESIA FOR LABOR

## 2018-09-26 PROCEDURE — 59409 OBSTETRICAL CARE: CPT

## 2018-09-26 PROCEDURE — 83735 ASSAY OF MAGNESIUM: CPT

## 2018-09-26 PROCEDURE — 700101 HCHG RX REV CODE 250: Performed by: NURSE PRACTITIONER

## 2018-09-26 PROCEDURE — 770002 HCHG ROOM/CARE - OB PRIVATE (112)

## 2018-09-26 PROCEDURE — 0DQR0ZZ REPAIR ANAL SPHINCTER, OPEN APPROACH: ICD-10-PCS | Performed by: OBSTETRICS & GYNECOLOGY

## 2018-09-26 PROCEDURE — 700105 HCHG RX REV CODE 258: Performed by: OBSTETRICS & GYNECOLOGY

## 2018-09-26 PROCEDURE — 90471 IMMUNIZATION ADMIN: CPT

## 2018-09-26 PROCEDURE — 700111 HCHG RX REV CODE 636 W/ 250 OVERRIDE (IP): Performed by: ANESTHESIOLOGY

## 2018-09-26 PROCEDURE — 700111 HCHG RX REV CODE 636 W/ 250 OVERRIDE (IP): Performed by: NURSE PRACTITIONER

## 2018-09-26 PROCEDURE — 3E0234Z INTRODUCTION OF SERUM, TOXOID AND VACCINE INTO MUSCLE, PERCUTANEOUS APPROACH: ICD-10-PCS | Performed by: OBSTETRICS & GYNECOLOGY

## 2018-09-26 PROCEDURE — 700102 HCHG RX REV CODE 250 W/ 637 OVERRIDE(OP): Performed by: NURSE PRACTITIONER

## 2018-09-26 PROCEDURE — A9270 NON-COVERED ITEM OR SERVICE: HCPCS | Performed by: OBSTETRICS & GYNECOLOGY

## 2018-09-26 PROCEDURE — 90686 IIV4 VACC NO PRSV 0.5 ML IM: CPT | Performed by: OBSTETRICS & GYNECOLOGY

## 2018-09-26 PROCEDURE — 700111 HCHG RX REV CODE 636 W/ 250 OVERRIDE (IP)

## 2018-09-26 PROCEDURE — 36415 COLL VENOUS BLD VENIPUNCTURE: CPT

## 2018-09-26 PROCEDURE — 700111 HCHG RX REV CODE 636 W/ 250 OVERRIDE (IP): Performed by: OBSTETRICS & GYNECOLOGY

## 2018-09-26 PROCEDURE — A9270 NON-COVERED ITEM OR SERVICE: HCPCS | Performed by: NURSE PRACTITIONER

## 2018-09-26 PROCEDURE — 700102 HCHG RX REV CODE 250 W/ 637 OVERRIDE(OP): Performed by: OBSTETRICS & GYNECOLOGY

## 2018-09-26 PROCEDURE — 700112 HCHG RX REV CODE 229: Performed by: OBSTETRICS & GYNECOLOGY

## 2018-09-26 PROCEDURE — 304965 HCHG RECOVERY SERVICES

## 2018-09-26 PROCEDURE — 700111 HCHG RX REV CODE 636 W/ 250 OVERRIDE (IP): Performed by: STUDENT IN AN ORGANIZED HEALTH CARE EDUCATION/TRAINING PROGRAM

## 2018-09-26 RX ORDER — CARBOPROST TROMETHAMINE 250 UG/ML
250 INJECTION, SOLUTION INTRAMUSCULAR
Status: DISCONTINUED | OUTPATIENT
Start: 2018-09-26 | End: 2018-09-28 | Stop reason: HOSPADM

## 2018-09-26 RX ORDER — MISOPROSTOL 200 UG/1
TABLET ORAL
Status: ACTIVE
Start: 2018-09-26 | End: 2018-09-26

## 2018-09-26 RX ORDER — ONDANSETRON 2 MG/ML
4 INJECTION INTRAMUSCULAR; INTRAVENOUS EVERY 6 HOURS PRN
Status: DISCONTINUED | OUTPATIENT
Start: 2018-09-26 | End: 2018-09-27

## 2018-09-26 RX ORDER — CALCIUM GLUCONATE 94 MG/ML
1 INJECTION, SOLUTION INTRAVENOUS
Status: DISCONTINUED | OUTPATIENT
Start: 2018-09-26 | End: 2018-09-27

## 2018-09-26 RX ORDER — DOCUSATE SODIUM 100 MG/1
100 CAPSULE, LIQUID FILLED ORAL 2 TIMES DAILY PRN
Status: DISCONTINUED | OUTPATIENT
Start: 2018-09-26 | End: 2018-09-28 | Stop reason: HOSPADM

## 2018-09-26 RX ORDER — ACETAMINOPHEN 325 MG/1
650 TABLET ORAL EVERY 4 HOURS PRN
Status: DISCONTINUED | OUTPATIENT
Start: 2018-09-26 | End: 2018-09-26 | Stop reason: HOSPADM

## 2018-09-26 RX ORDER — SODIUM CHLORIDE, SODIUM LACTATE, POTASSIUM CHLORIDE, CALCIUM CHLORIDE 600; 310; 30; 20 MG/100ML; MG/100ML; MG/100ML; MG/100ML
INJECTION, SOLUTION INTRAVENOUS PRN
Status: DISCONTINUED | OUTPATIENT
Start: 2018-09-26 | End: 2018-09-28 | Stop reason: HOSPADM

## 2018-09-26 RX ORDER — MISOPROSTOL 200 UG/1
800 TABLET ORAL
Status: COMPLETED | OUTPATIENT
Start: 2018-09-26 | End: 2018-09-26

## 2018-09-26 RX ORDER — VITAMIN A ACETATE, BETA CAROTENE, ASCORBIC ACID, CHOLECALCIFEROL, .ALPHA.-TOCOPHEROL ACETATE, DL-, THIAMINE MONONITRATE, RIBOFLAVIN, NIACINAMIDE, PYRIDOXINE HYDROCHLORIDE, FOLIC ACID, CYANOCOBALAMIN, CALCIUM CARBONATE, FERROUS FUMARATE, ZINC OXIDE, CUPRIC OXIDE 3080; 12; 120; 400; 1; 1.84; 3; 20; 22; 920; 25; 200; 27; 10; 2 [IU]/1; UG/1; MG/1; [IU]/1; MG/1; MG/1; MG/1; MG/1; MG/1; [IU]/1; MG/1; MG/1; MG/1; MG/1; MG/1
1 TABLET, FILM COATED ORAL EVERY MORNING
Status: DISCONTINUED | OUTPATIENT
Start: 2018-09-26 | End: 2018-09-28 | Stop reason: HOSPADM

## 2018-09-26 RX ORDER — IBUPROFEN 600 MG/1
600 TABLET ORAL EVERY 6 HOURS PRN
Status: DISCONTINUED | OUTPATIENT
Start: 2018-09-26 | End: 2018-09-27

## 2018-09-26 RX ORDER — MAGNESIUM SULFATE HEPTAHYDRATE 40 MG/ML
2 INJECTION, SOLUTION INTRAVENOUS ONCE
Status: DISCONTINUED | OUTPATIENT
Start: 2018-09-26 | End: 2018-09-27

## 2018-09-26 RX ORDER — SODIUM CHLORIDE, SODIUM LACTATE, POTASSIUM CHLORIDE, CALCIUM CHLORIDE 600; 310; 30; 20 MG/100ML; MG/100ML; MG/100ML; MG/100ML
INJECTION, SOLUTION INTRAVENOUS CONTINUOUS
Status: DISCONTINUED | OUTPATIENT
Start: 2018-09-26 | End: 2018-09-27

## 2018-09-26 RX ORDER — ACETAMINOPHEN 325 MG/1
650 TABLET ORAL EVERY 4 HOURS PRN
Status: DISCONTINUED | OUTPATIENT
Start: 2018-09-26 | End: 2018-09-28 | Stop reason: HOSPADM

## 2018-09-26 RX ORDER — DIPHENOXYLATE HYDROCHLORIDE AND ATROPINE SULFATE 2.5; .025 MG/1; MG/1
1 TABLET ORAL 4 TIMES DAILY PRN
Status: DISCONTINUED | OUTPATIENT
Start: 2018-09-26 | End: 2018-09-28 | Stop reason: HOSPADM

## 2018-09-26 RX ORDER — OXYCODONE HYDROCHLORIDE AND ACETAMINOPHEN 5; 325 MG/1; MG/1
1 TABLET ORAL EVERY 4 HOURS PRN
Status: DISCONTINUED | OUTPATIENT
Start: 2018-09-26 | End: 2018-09-28 | Stop reason: HOSPADM

## 2018-09-26 RX ORDER — CARBOPROST TROMETHAMINE 250 UG/ML
250 INJECTION, SOLUTION INTRAMUSCULAR
Status: COMPLETED | OUTPATIENT
Start: 2018-09-26 | End: 2018-09-26

## 2018-09-26 RX ORDER — MISOPROSTOL 200 UG/1
600 TABLET ORAL
Status: DISCONTINUED | OUTPATIENT
Start: 2018-09-26 | End: 2018-09-28 | Stop reason: HOSPADM

## 2018-09-26 RX ORDER — OXYCODONE AND ACETAMINOPHEN 10; 325 MG/1; MG/1
1 TABLET ORAL EVERY 4 HOURS PRN
Status: DISCONTINUED | OUTPATIENT
Start: 2018-09-26 | End: 2018-09-28 | Stop reason: HOSPADM

## 2018-09-26 RX ORDER — IBUPROFEN 800 MG/1
800 TABLET ORAL EVERY 8 HOURS PRN
Status: DISCONTINUED | OUTPATIENT
Start: 2018-09-26 | End: 2018-09-28 | Stop reason: HOSPADM

## 2018-09-26 RX ORDER — LIDOCAINE HYDROCHLORIDE 10 MG/ML
INJECTION, SOLUTION EPIDURAL; INFILTRATION; INTRACAUDAL; PERINEURAL
Status: COMPLETED
Start: 2018-09-26 | End: 2018-09-26

## 2018-09-26 RX ORDER — OXYCODONE HYDROCHLORIDE AND ACETAMINOPHEN 5; 325 MG/1; MG/1
1 TABLET ORAL EVERY 4 HOURS PRN
Status: DISCONTINUED | OUTPATIENT
Start: 2018-09-26 | End: 2018-09-27

## 2018-09-26 RX ADMIN — OXYCODONE HYDROCHLORIDE AND ACETAMINOPHEN 1 TABLET: 5; 325 TABLET ORAL at 23:15

## 2018-09-26 RX ADMIN — Medication 125 ML/HR: at 06:45

## 2018-09-26 RX ADMIN — OXYCODONE HYDROCHLORIDE AND ACETAMINOPHEN 1 TABLET: 10; 325 TABLET ORAL at 07:19

## 2018-09-26 RX ADMIN — MISOPROSTOL 800 MCG: 200 TABLET ORAL at 06:38

## 2018-09-26 RX ADMIN — DOCUSATE SODIUM 100 MG: 100 CAPSULE, LIQUID FILLED ORAL at 23:15

## 2018-09-26 RX ADMIN — MAGNESIUM SULFATE IN WATER 2 G/HR: 40 INJECTION, SOLUTION INTRAVENOUS at 02:18

## 2018-09-26 RX ADMIN — INFLUENZA A VIRUS A/MICHIGAN/45/2015 X-275 (H1N1) ANTIGEN (FORMALDEHYDE INACTIVATED), INFLUENZA A VIRUS A/SINGAPORE/INFIMH-16-0019/2016 IVR-186 (H3N2) ANTIGEN (FORMALDEHYDE INACTIVATED), INFLUENZA B VIRUS B/PHUKET/3073/2013 ANTIGEN (FORMALDEHYDE INACTIVATED), AND INFLUENZA B VIRUS B/MARYLAND/15/2016 BX-69A ANTIGEN (FORMALDEHYDE INACTIVATED) 0.5 ML: 15; 15; 15; 15 INJECTION, SUSPENSION INTRAMUSCULAR at 19:28

## 2018-09-26 RX ADMIN — SODIUM CHLORIDE, POTASSIUM CHLORIDE, SODIUM LACTATE AND CALCIUM CHLORIDE: 600; 310; 30; 20 INJECTION, SOLUTION INTRAVENOUS at 13:44

## 2018-09-26 RX ADMIN — DIPHENOXYLATE HYDROCHLORIDE AND ATROPINE SULFATE 1 TABLET: 2.5; .025 TABLET ORAL at 07:28

## 2018-09-26 RX ADMIN — CARBOPROST TROMETHAMINE 250 MCG: 250 INJECTION, SOLUTION INTRAMUSCULAR at 06:56

## 2018-09-26 RX ADMIN — Medication 2000 ML/HR: at 06:20

## 2018-09-26 RX ADMIN — IBUPROFEN 800 MG: 800 TABLET, FILM COATED ORAL at 23:15

## 2018-09-26 RX ADMIN — MAGNESIUM SULFATE IN WATER 2 G/HR: 40 INJECTION, SOLUTION INTRAVENOUS at 13:42

## 2018-09-26 RX ADMIN — LIDOCAINE HYDROCHLORIDE: 10 INJECTION, SOLUTION EPIDURAL; INFILTRATION; INTRACAUDAL; PERINEURAL at 06:30

## 2018-09-26 RX ADMIN — ACETAMINOPHEN 650 MG: 325 TABLET, FILM COATED ORAL at 01:25

## 2018-09-26 RX ADMIN — IBUPROFEN 800 MG: 800 TABLET, FILM COATED ORAL at 07:19

## 2018-09-26 RX ADMIN — ROPIVACAINE HYDROCHLORIDE: 2 INJECTION, SOLUTION EPIDURAL; INFILTRATION at 04:05

## 2018-09-26 ASSESSMENT — PAIN SCALES - GENERAL
PAINLEVEL_OUTOF10: 0
PAINLEVEL_OUTOF10: 7
PAINLEVEL_OUTOF10: 0

## 2018-09-26 ASSESSMENT — PATIENT HEALTH QUESTIONNAIRE - PHQ9
1. LITTLE INTEREST OR PLEASURE IN DOING THINGS: NOT AT ALL
2. FEELING DOWN, DEPRESSED, IRRITABLE, OR HOPELESS: NOT AT ALL
SUM OF ALL RESPONSES TO PHQ9 QUESTIONS 1 AND 2: 0

## 2018-09-26 NOTE — PROGRESS NOTES
Sugar Chatterjee Ding   40w4d    Subjective: Pt very comfortable with epidural in place.  Denies ha, RUQ pain or visual changes.  Tolerating magnesium without side effects.    uterine contractions:yes, pain: .no  nausea/vomiting: .noepigastric pain:.no:      fetal movement: normal, vaginal bleeding: .no    Objective:   Vitals:    09/25/18 2108 09/25/18 2110 09/25/18 2113 09/25/18 2114   BP:       Pulse:  99 (!) 104 (!) 105   Resp: 18      Temp: 37.1 °C (98.7 °F)      TempSrc: Temporal      SpO2:   98%    Weight:       Height:         Fetal heart variability: moderate  Fetal Heart Rate decelerations: none  Fetal Heart Rate accelerations: yes  Uterine contractions: irregular, every 2-4 minutes  Contractions: moderate on palpation  Cervical Exam 5/90/-1/  Fetal position: Cephalic  Membranes: ruptured: .yes  AROM: .done around 1500, Meconium: .yes, light mec  IUPC: .no, FSE .no  Last BP: 127/77    Meds:     Epidural : .yes  Magnesium sulfate: .yes  Pitocin: .yes at 10 mu/min    Labs:    Lab:   Recent Results (from the past 48 hour(s))   POCT Urinalysis    Collection Time: 09/25/18  1:30 PM   Result Value Ref Range    POC Color  Negative    POC Appearance  Negative    POC Leukocyte Esterase Negative Negative    POC Nitrites Negative Negative    POC Urobiligen  Negative (0.2) mg/dL    POC Protein 300 Negative mg/dL    POC Urine PH 6.0 5.0 - 8.0    POC Blood Trace Negative    POC Specific Gravity 1.020 <1.005 - >1.030    POC Ketones Trace Negative mg/dL    POC Bilirubin  Negative mg/dL    POC Glucose Negative Negative mg/dL   POCT Fetal Nonstress Test    Collection Time: 09/25/18  2:14 PM   Result Value Ref Range    NST Indications      NST Baseline      NST Uterine Activity      NST Acoustic Stimulation      NST Assessment      NST Action Necessary      NST Other Data      NST Return      NST Read By     POC UA    Collection Time: 09/25/18  2:58 PM   Result Value Ref Range    POC Color Yellow     POC Appearance Clear      POC Glucose Negative Negative mg/dL    POC Ketones Negative Negative mg/dL    POC Specific Gravity 1.020 1.005 - 1.030    POC Blood Large (A) Negative    POC Urine PH 6.0 5.0 - 8.0    POC Protein >=300 (A) Negative mg/dL    POC Nitrites Negative Negative    POC Leukocyte Esterase Trace (A) Negative   CBC WITH DIFFERENTIAL    Collection Time: 09/25/18  3:00 PM   Result Value Ref Range    WBC 12.7 (H) 4.8 - 10.8 K/uL    RBC 4.22 4.20 - 5.40 M/uL    Hemoglobin 11.8 (L) 12.0 - 16.0 g/dL    Hematocrit 35.6 (L) 37.0 - 47.0 %    MCV 84.4 81.4 - 97.8 fL    MCH 28.0 27.0 - 33.0 pg    MCHC 33.1 (L) 33.6 - 35.0 g/dL    RDW 45.1 35.9 - 50.0 fL    Platelet Count 245 164 - 446 K/uL    MPV 12.4 9.0 - 12.9 fL    Neutrophils-Polys 77.90 (H) 44.00 - 72.00 %    Lymphocytes 15.20 (L) 22.00 - 41.00 %    Monocytes 5.90 0.00 - 13.40 %    Eosinophils 0.20 0.00 - 6.90 %    Basophils 0.20 0.00 - 1.80 %    Immature Granulocytes 0.60 0.00 - 0.90 %    Nucleated RBC 0.20 /100 WBC    Neutrophils (Absolute) 9.91 (H) 2.00 - 7.15 K/uL    Lymphs (Absolute) 1.94 1.00 - 4.80 K/uL    Monos (Absolute) 0.75 0.00 - 0.85 K/uL    Eos (Absolute) 0.03 0.00 - 0.51 K/uL    Baso (Absolute) 0.03 0.00 - 0.12 K/uL    Immature Granulocytes (abs) 0.08 0.00 - 0.11 K/uL    NRBC (Absolute) 0.02 K/uL   COMP METABOLIC PANEL    Collection Time: 09/25/18  3:00 PM   Result Value Ref Range    Sodium 137 135 - 145 mmol/L    Potassium 3.8 3.6 - 5.5 mmol/L    Chloride 106 96 - 112 mmol/L    Co2 21 20 - 33 mmol/L    Anion Gap 10.0 0.0 - 11.9    Glucose 81 65 - 99 mg/dL    Bun 10 8 - 22 mg/dL    Creatinine 0.66 0.50 - 1.40 mg/dL    Calcium 9.3 8.5 - 10.5 mg/dL    AST(SGOT) 15 12 - 45 U/L    ALT(SGPT) 9 2 - 50 U/L    Alkaline Phosphatase 171 (H) 30 - 99 U/L    Total Bilirubin 0.4 0.1 - 1.5 mg/dL    Albumin 3.1 (L) 3.2 - 4.9 g/dL    Total Protein 6.9 6.0 - 8.2 g/dL    Globulin 3.8 (H) 1.9 - 3.5 g/dL    A-G Ratio 0.8 g/dL   URIC ACID    Collection Time: 09/25/18  3:00 PM   Result  Value Ref Range    Uric Acid 5.9 1.9 - 8.2 mg/dL   ESTIMATED GFR    Collection Time: 18  3:00 PM   Result Value Ref Range    GFR If African American >60 >60 mL/min/1.73 m 2    GFR If Non African American >60 >60 mL/min/1.73 m 2   Hold Blood Bank Specimen (Not Tested)    Collection Time: 18  3:20 PM   Result Value Ref Range    Holding Tube - Bb DONE    MAGNESIUM    Collection Time: 18  6:46 PM   Result Value Ref Range    Magnesium 4.5 (H) 1.5 - 2.5 mg/dL       Ass:   40w4d  Labor State: Prolonged latent labor without cervical change.  GBS negative  Comfortable with epidural in place.  Stable BPs on mag  Afebrile  Category 1 strip    P.   1. Anticipate   2. IUPC placed and titrate pitocin accordingly  3. FSE placed  4. Continue with peanut ball and position changes  5. Continue magnesium per protocol  6. Monitor BP's per protocol  7. Reassess 2-3 hrs or PRN

## 2018-09-26 NOTE — PROGRESS NOTES
1855 Report received, pt care assumed.   1900 Dr. Medina at bedside  190 Epidural time out, pt sitting for epidural  190 Epidural cath placed  191 Epidural test dose  191 Epidural loading dose  192 Epidural pump started.   0255 DEE Reyes CNM notified of pt SVE, okay to labor down x one hour then start pushing.   0400 RN at bedside, talking to pt about pushing, teaching on how to push, pt verbalized understanding.   0417 RN at bedside attempting to push with pt. Several attempts at breathing, correct positioning, and pushing pt verbalized understanding but unable to feel pressure and no pushing efforts noted.   0440 DEE Reyes CNM notified of pt with no pushing efforts, pt repositioned to Right lateral with peanut ball, will call anesthesia to see if epidural continuous rate can be decreased to help pt with pushing sensation.   0441 Dr. Medina notified of pt unable to push d/t decreased sensation, orders to decreased epidural continuous dose to 4.   0445 Epidural continuous rate decreased to 4. Pt instructed to call RN if she feels pressure or feels urge to push, pt verbalized understanding.   0534 Pt pushing effectively  0540 hancock cath removed for pushing  0605 CLAUDIO Castro at bedside for delivery  0607 MARTA Marquis, VIRGIE and RT Eladio and RT Megan at bedside for meconium delivery. Dr. Villaseñor at bedside  0613  viable female  0618 Dr. Villaseñor starting vaginal repair  0620 Pitocin bolus started  0635 Placenta delivered  0638 Cytotec given per rectal by Dr. Villaseñor  0700 Report to RANJAN Clarke RN

## 2018-09-26 NOTE — PROGRESS NOTES
Carmen from Lab called with critical result of Magnesium at 6.3. Critical lab result read back to Carmen.   Dr. Villaseñor notified of critical lab result at 0235.  Critical lab result read back by Dr. Villaseñor.

## 2018-09-26 NOTE — PROGRESS NOTES
0700: Assumed care of pt. AAO, repair in process by Dr. Villaseñor, orders received, pt ;medicated per MAR, IV infiltrated and replaced. POC discussed, pt verbalized understanding.

## 2018-09-26 NOTE — PROGRESS NOTES
9:17 PM         []Hide copied text  []Reubenver for attribution information  Vaginal Delivery Procedure Note:     Sugar Ding is a 23y.o. , now Para 1001     Weeks of gestation: 40 weeks 5days  Diagnosis: IOL for pre-eclampsia     Pt progressed to the second stage of labor with Pitocin augmentation.     of viable female infant  APGARs pending  Birth weight pending  Nuchal cord not present, shoulders delivered easily with maternal expulsive efforts. The baby was placed on the maternal abdomen.    The cord was double clamped after 1 minute, and cut by the FOB.   Perineal laceration:  3rd degree sphincter capsule repaired by Dr Villaseñor using 3-0Chromic and the remainder of the laceration repaired in usual fashion  Excellent hemostasis.  EBL pending  Anesthesia - epidural    Loan Reyes CNM, APRN  Dr Villaseñor attending and present at delivery  Patient tolerated procedure well.

## 2018-09-26 NOTE — PROGRESS NOTES
1030: Dr. Augustin to bedside for evaluation of bleeding, order received to transfer pt to postpartum.  1040: Pericare complete, pt ambulated to wheelchair and transferred to postpartum. Report given to Florina GARVIN, pt in stable condition with a firm fundus and light lochia.

## 2018-09-26 NOTE — CARE PLAN
Problem: Altered physiologic condition related to immediate post-delivery state and potential for bleeding/hemorrhage  Goal: Patient physiologically stable as evidenced by normal lochia, palpable uterine involution and vital signs within normal limits  Outcome: PROGRESSING AS EXPECTED  Informed patient and family of what to expect postpartum.     Problem: Alteration in comfort related to episiotomy, vaginal repair and/or after birth pains  Goal: Patient is able to ambulate, care for self and infant  Outcome: PROGRESSING AS EXPECTED  Will medicate for pain as needed.     Problem: Potential knowledge deficit related to lack of understanding of self and  care  Goal: Patient will verbalize understanding of self and infant care  Outcome: PROGRESSING AS EXPECTED

## 2018-09-26 NOTE — PROGRESS NOTES
"Bedside report received from Toshia GARVIN @ 5068. Infant is in the nursery for doctor's assessment. Oriented patient to the room, introduced the call light. Encouraged patient to use the \"Do not disturb sign if needed\". Discussed plan of care. Assessment done. Denies pain. Encouraged to call if with need. Will check at intervals.       "

## 2018-09-26 NOTE — LACTATION NOTE
12:30- lactation initial visit done. Baby asleep in [place]. Mother reports baby  once but she experienced discomfort with latch. MOB eating her lunch. Encouraged to place baby skin to skin after feeding and when she notices hunger cues, call for help with latch.  MOB voices understanding. Discussed hunger cues and normal feeding frequency for first 4 days.

## 2018-09-26 NOTE — PROGRESS NOTES
Sugar Ding   40w4d    Subjective: Pt just had epidural placed and is starting to get comfortable.  Pt denies ha, epigastric pain or visual changes.  She denies any side effects of the magnesium.      uterine contractions:yes,   pain: .no  nausea/vomiting: .noepigastric pain:.no:      fetal movement: normal, vaginal bleeding: .no    Objective:   Vitals:    18 1835 18 1840 18 1853 18 1915   BP:   156/105    Pulse: 100 (!) 102 (!) 106    Resp:    16   Temp:    36.6 °C (97.9 °F)   TempSrc:    Temporal   SpO2: 99% 97%     Weight:       Height:         Category 1   FHR 135bpm, + accels, - decels, moderate variability  Contractions: 2-4, moderate on palpation  Cervical Exam 5/90/-1 acynclitic  Fetal position: Cephalic  Membranes: ruptured: .yes  AROM: .done at 1500, Meconium: .thin, light yellow colored meconium  IUPC: .no, FSE .no  BP's during sitting phase of epidural 150s/105, once laid down 130/80s  Meds:     Epidural : .yes  Magnesium sulfate: .yes  Pitocin: .yes at 7 mu/min    Labs:    Lab:   Recent Results (from the past 48 hour(s))   POCT Urinalysis    Collection Time: 18  1:30 PM   Result Value Ref Range    POC Color  Negative    POC Appearance  Negative    POC Leukocyte Esterase Negative Negative    POC Nitrites Negative Negative    POC Urobiligen  Negative (0.2) mg/dL    POC Protein 300 Negative mg/dL    POC Urine PH 6.0 5.0 - 8.0    POC Blood Trace Negative    POC Specific Gravity 1.020 <1.005 - >1.030    POC Ketones Trace Negative mg/dL    POC Bilirubin  Negative mg/dL    POC Glucose Negative Negative mg/dL   POCT Fetal Nonstress Test    Collection Time: 18  2:14 PM   Result Value Ref Range    NST Indications      NST Baseline      NST Uterine Activity      NST Acoustic Stimulation      NST Assessment      NST Action Necessary      NST Other Data      NST Return      NST Read By     POC UA    Collection Time: 18  2:58 PM   Result Value Ref Range     POC Color Yellow     POC Appearance Clear     POC Glucose Negative Negative mg/dL    POC Ketones Negative Negative mg/dL    POC Specific Gravity 1.020 1.005 - 1.030    POC Blood Large (A) Negative    POC Urine PH 6.0 5.0 - 8.0    POC Protein >=300 (A) Negative mg/dL    POC Nitrites Negative Negative    POC Leukocyte Esterase Trace (A) Negative   CBC WITH DIFFERENTIAL    Collection Time: 09/25/18  3:00 PM   Result Value Ref Range    WBC 12.7 (H) 4.8 - 10.8 K/uL    RBC 4.22 4.20 - 5.40 M/uL    Hemoglobin 11.8 (L) 12.0 - 16.0 g/dL    Hematocrit 35.6 (L) 37.0 - 47.0 %    MCV 84.4 81.4 - 97.8 fL    MCH 28.0 27.0 - 33.0 pg    MCHC 33.1 (L) 33.6 - 35.0 g/dL    RDW 45.1 35.9 - 50.0 fL    Platelet Count 245 164 - 446 K/uL    MPV 12.4 9.0 - 12.9 fL    Neutrophils-Polys 77.90 (H) 44.00 - 72.00 %    Lymphocytes 15.20 (L) 22.00 - 41.00 %    Monocytes 5.90 0.00 - 13.40 %    Eosinophils 0.20 0.00 - 6.90 %    Basophils 0.20 0.00 - 1.80 %    Immature Granulocytes 0.60 0.00 - 0.90 %    Nucleated RBC 0.20 /100 WBC    Neutrophils (Absolute) 9.91 (H) 2.00 - 7.15 K/uL    Lymphs (Absolute) 1.94 1.00 - 4.80 K/uL    Monos (Absolute) 0.75 0.00 - 0.85 K/uL    Eos (Absolute) 0.03 0.00 - 0.51 K/uL    Baso (Absolute) 0.03 0.00 - 0.12 K/uL    Immature Granulocytes (abs) 0.08 0.00 - 0.11 K/uL    NRBC (Absolute) 0.02 K/uL   COMP METABOLIC PANEL    Collection Time: 09/25/18  3:00 PM   Result Value Ref Range    Sodium 137 135 - 145 mmol/L    Potassium 3.8 3.6 - 5.5 mmol/L    Chloride 106 96 - 112 mmol/L    Co2 21 20 - 33 mmol/L    Anion Gap 10.0 0.0 - 11.9    Glucose 81 65 - 99 mg/dL    Bun 10 8 - 22 mg/dL    Creatinine 0.66 0.50 - 1.40 mg/dL    Calcium 9.3 8.5 - 10.5 mg/dL    AST(SGOT) 15 12 - 45 U/L    ALT(SGPT) 9 2 - 50 U/L    Alkaline Phosphatase 171 (H) 30 - 99 U/L    Total Bilirubin 0.4 0.1 - 1.5 mg/dL    Albumin 3.1 (L) 3.2 - 4.9 g/dL    Total Protein 6.9 6.0 - 8.2 g/dL    Globulin 3.8 (H) 1.9 - 3.5 g/dL    A-G Ratio 0.8 g/dL   URIC ACID     Collection Time: 18  3:00 PM   Result Value Ref Range    Uric Acid 5.9 1.9 - 8.2 mg/dL   ESTIMATED GFR    Collection Time: 18  3:00 PM   Result Value Ref Range    GFR If African American >60 >60 mL/min/1.73 m 2    GFR If Non African American >60 >60 mL/min/1.73 m 2   Hold Blood Bank Specimen (Not Tested)    Collection Time: 18  3:20 PM   Result Value Ref Range    Holding Tube - Bb DONE    MAGNESIUM    Collection Time: 18  6:46 PM   Result Value Ref Range    Magnesium 4.5 (H) 1.5 - 2.5 mg/dL       Ass:   40w4d  Labor State: early labor  GBS negative    P.   1. Anticipate   2. Reassess for cervical change in 2 hours, IUPC if necessary  3. Continue magnesium per protocol  4. Continue pitocin augmentation  5. Monitor BPs per protocol

## 2018-09-27 LAB
ERYTHROCYTE [DISTWIDTH] IN BLOOD BY AUTOMATED COUNT: 46.3 FL (ref 35.9–50)
HCT VFR BLD AUTO: 24 % (ref 37–47)
HGB BLD-MCNC: 7.9 G/DL (ref 12–16)
MAGNESIUM SERPL-MCNC: 6.5 MG/DL (ref 1.5–2.5)
MCH RBC QN AUTO: 27.1 PG (ref 27–33)
MCHC RBC AUTO-ENTMCNC: 32.1 G/DL (ref 33.6–35)
MCV RBC AUTO: 84.5 FL (ref 81.4–97.8)
PLATELET # BLD AUTO: 204 K/UL (ref 164–446)
PMV BLD AUTO: 11.7 FL (ref 9–12.9)
RBC # BLD AUTO: 2.84 M/UL (ref 4.2–5.4)
WBC # BLD AUTO: 23 K/UL (ref 4.8–10.8)

## 2018-09-27 PROCEDURE — 700111 HCHG RX REV CODE 636 W/ 250 OVERRIDE (IP): Performed by: STUDENT IN AN ORGANIZED HEALTH CARE EDUCATION/TRAINING PROGRAM

## 2018-09-27 PROCEDURE — 700102 HCHG RX REV CODE 250 W/ 637 OVERRIDE(OP): Performed by: OBSTETRICS & GYNECOLOGY

## 2018-09-27 PROCEDURE — A9270 NON-COVERED ITEM OR SERVICE: HCPCS | Performed by: OBSTETRICS & GYNECOLOGY

## 2018-09-27 PROCEDURE — 85027 COMPLETE CBC AUTOMATED: CPT

## 2018-09-27 PROCEDURE — 700112 HCHG RX REV CODE 229: Performed by: OBSTETRICS & GYNECOLOGY

## 2018-09-27 PROCEDURE — 770002 HCHG ROOM/CARE - OB PRIVATE (112)

## 2018-09-27 PROCEDURE — 83735 ASSAY OF MAGNESIUM: CPT

## 2018-09-27 PROCEDURE — 36415 COLL VENOUS BLD VENIPUNCTURE: CPT

## 2018-09-27 RX ADMIN — IBUPROFEN 800 MG: 800 TABLET, FILM COATED ORAL at 19:42

## 2018-09-27 RX ADMIN — IBUPROFEN 800 MG: 800 TABLET, FILM COATED ORAL at 08:41

## 2018-09-27 RX ADMIN — MAGNESIUM SULFATE IN WATER 2 G/HR: 40 INJECTION, SOLUTION INTRAVENOUS at 00:07

## 2018-09-27 RX ADMIN — Medication 1 TABLET: at 06:19

## 2018-09-27 RX ADMIN — OXYCODONE HYDROCHLORIDE AND ACETAMINOPHEN 1 TABLET: 5; 325 TABLET ORAL at 19:42

## 2018-09-27 RX ADMIN — DOCUSATE SODIUM 100 MG: 100 CAPSULE, LIQUID FILLED ORAL at 19:45

## 2018-09-27 ASSESSMENT — PAIN SCALES - GENERAL
PAINLEVEL_OUTOF10: 2
PAINLEVEL_OUTOF10: 5
PAINLEVEL_OUTOF10: 3
PAINLEVEL_OUTOF10: 0

## 2018-09-27 NOTE — PROGRESS NOTES
Post Partum Progress Note    Name:   Sugar Ding   Date/Time:  9/27/2018 - 6:23 AM  Chief Admitting Dx:  Pregnancy  Labor and delivery, indication for care  Delivery Type:  vaginal, spontaneous  Post-Op/Post Partum Days #:  1    Subjective:  Abdominal pain: no  Ambulating:   yes  Tolerating liquids:  yes  Tolerating food:  yes common adult  Flatus:   yes  BM:    no  Bleeding:   without any bleeding  Voiding:   yes  Dizziness:   no  Feeding:   breast    Vitals:    09/27/18 0200 09/27/18 0300 09/27/18 0400 09/27/18 0600   BP: 133/78 109/71 116/64 114/51   Pulse: 97 91 93 80   Resp: 18 17 16 17   Temp:   36.8 °C (98.3 °F)    TempSrc:       SpO2: 97% 97% 97% 100%   Weight:       Height:           Exam:  Breast: Tenderness no  Abdomen: Abdomen soft, non-tender. BS normal. No masses,  No organomegaly  Fundal Tenderness:  no  Fundus Firm: yes  Incision: none  Below umbilicus: yes  Perineum: 3rd degree tear  Lochia: mild  Extremities: Normal extremities, peripheral pulses and reflexes normal    Meds:  Current Facility-Administered Medications   Medication Dose   • oxytocin (PITOCIN) infusion (for postpartum)   mL/hr   • oxyCODONE-acetaminophen (PERCOCET) 5-325 MG per tablet 1 Tab  1 Tab   • oxyCODONE-acetaminophen (PERCOCET-10)  MG per tablet 1 Tab  1 Tab   • ibuprofen (MOTRIN) tablet 800 mg  800 mg   • diphenoxylate-atropine (LOMOTIL) 2.5-0.025 MG per tablet 1 Tab  1 Tab   • LR infusion     • PRN oxytocin (PITOCIN) (20 Units/1000 mL) PRN for excessive uterine bleeding - See Admin Instr  125-999 mL/hr   • miSOPROStol (CYTOTEC) tablet 600 mcg  600 mcg   • docusate sodium (COLACE) capsule 100 mg  100 mg   • carboPROST (HEMABATE) injection 250 mcg  250 mcg   • prenatal plus vitamin (STUARTNATAL 1+1) 27-1 MG tablet 1 Tab  1 Tab   • ibuprofen (MOTRIN) tablet 600 mg  600 mg   • acetaminophen (TYLENOL) tablet 650 mg  650 mg   • oxyCODONE-acetaminophen (PERCOCET) 5-325 MG per tablet 1 Tab  1 Tab   •  lactated ringers infusion     • ondansetron (ZOFRAN) syringe/vial injection 4 mg  4 mg   • magnesium sulfate IVPB premix 2 g  2 g   • calcium GLUConate injection 1 g  1 g   • ondansetron (ZOFRAN ODT) dispertab 4 mg  4 mg    Or   • ondansetron (ZOFRAN) syringe/vial injection 4 mg  4 mg   • magnesium sulfate 20 g/500mL infusion  2 g/hr   • oxytocin (PITOCIN) 20 UNITS/1000ML LR (induction of labor)  0.5-20 edmundo-units/min   • ropivacaine (NAROPIN) injection         Labs:   Recent Labs      09/25/18   1500   WBC  12.7*   RBC  4.22   HEMOGLOBIN  11.8*   HEMATOCRIT  35.6*   MCV  84.4   MCH  28.0   MCHC  33.1*   RDW  45.1   PLATELETCT  245   MPV  12.4       Assessment:  Chief Admitting Dx:  Pregnancy  Labor and delivery, indication for care  Delivery Type:  vaginal, spontaneous  Tubal Ligation:  no    Plan:  Continue routine post partum care.  Encourage breastfeeding, ambulation  Anticipate discharge on PPD#2    COLIN Joiner.

## 2018-09-27 NOTE — LACTATION NOTE
This note was copied from a baby's chart.  Mom on phone, returned in 1/2 hour. States baby may be ready to eat.  States sore nipples but once sucking soreness goes away.  Reports all family breastfeeds and feels supported.  Infant placed skin to skin at the breast and fell asleep, tried to awaken with no success.  Moved to chest, encouraged mother to call as baby begins to awaken over the next 30 minutes.

## 2018-09-27 NOTE — CARE PLAN
Problem: Altered physiologic condition related to immediate post-delivery state and potential for bleeding/hemorrhage  Goal: Patient physiologically stable as evidenced by normal lochia, palpable uterine involution and vital signs within normal limits  Outcome: PROGRESSING AS EXPECTED  Lochia light, fundus firm, VSS.     Problem: Alteration in comfort related to episiotomy, vaginal repair and/or after birth pains  Goal: Patient verbalizes acceptable pain level  Outcome: PROGRESSING AS EXPECTED  Patient states that pain is at an acceptable level. Motrin given for intermittent cramping. Pain management plan updated on whiteboard. She will call if she requires intervention.

## 2018-09-27 NOTE — CARE PLAN
Problem: Communication  Goal: The ability to communicate needs accurately and effectively will improve  Outcome: PROGRESSING AS EXPECTED  Patient ambulated to bathroom, no c/o of dizziness while ambulating, taking adequate PO Fluids and having adequate urine output. D/C hancock at 24 hours and stopped magnesium. Discontinued mag orders.     Problem: Pain Management  Goal: Pain level will decrease to patient's comfort goal  Outcome: PROGRESSING AS EXPECTED  Patient likes to call for PRN pain medication as needed.

## 2018-09-27 NOTE — PROGRESS NOTES
Report received from VIRGIE Coughlin. Plan of care reviewed with patient and significant other. Pain management plan updated on whiteboard. Encouraged to call for needs.

## 2018-09-27 NOTE — LACTATION NOTE
This note was copied from a baby's chart.  Mom did not call, RN in and encouraging breastfeeding.  Mom in shower, then mom eating lunch  Attempted one more time as infant awakens but at the breast falls asleep.  Pumping initiated for few effective latches and no urine out recorded  Infant offered 10ml Similac, grandma fed without difficulty.  Plan:  Offer breast and  if not effective, pump and formula feed baby according to brad/kg per day of age guidelines.

## 2018-09-28 VITALS
DIASTOLIC BLOOD PRESSURE: 81 MMHG | TEMPERATURE: 98.7 F | HEART RATE: 96 BPM | BODY MASS INDEX: 37 KG/M2 | RESPIRATION RATE: 18 BRPM | OXYGEN SATURATION: 99 % | SYSTOLIC BLOOD PRESSURE: 142 MMHG | HEIGHT: 61 IN | WEIGHT: 196 LBS

## 2018-09-28 PROBLEM — O09.899 RUBELLA NON-IMMUNE STATUS, ANTEPARTUM: Status: RESOLVED | Noted: 2018-06-28 | Resolved: 2018-09-28

## 2018-09-28 PROBLEM — Z28.39 RUBELLA NON-IMMUNE STATUS, ANTEPARTUM: Status: RESOLVED | Noted: 2018-06-28 | Resolved: 2018-09-28

## 2018-09-28 PROBLEM — Z34.90 SUPERVISION OF NORMAL PREGNANCY: Status: RESOLVED | Noted: 2018-05-25 | Resolved: 2018-09-28

## 2018-09-28 PROCEDURE — 700102 HCHG RX REV CODE 250 W/ 637 OVERRIDE(OP): Performed by: OBSTETRICS & GYNECOLOGY

## 2018-09-28 PROCEDURE — A9270 NON-COVERED ITEM OR SERVICE: HCPCS | Performed by: OBSTETRICS & GYNECOLOGY

## 2018-09-28 RX ORDER — IBUPROFEN 800 MG/1
800 TABLET ORAL EVERY 8 HOURS PRN
Qty: 30 TAB | Refills: 0 | Status: SHIPPED | OUTPATIENT
Start: 2018-09-28

## 2018-09-28 RX ORDER — OXYCODONE HYDROCHLORIDE AND ACETAMINOPHEN 5; 325 MG/1; MG/1
1-2 TABLET ORAL EVERY 4 HOURS PRN
Qty: 15 TAB | Refills: 0 | Status: SHIPPED | OUTPATIENT
Start: 2018-09-28 | End: 2018-09-28

## 2018-09-28 RX ORDER — OXYCODONE HYDROCHLORIDE AND ACETAMINOPHEN 5; 325 MG/1; MG/1
1-2 TABLET ORAL EVERY 4 HOURS PRN
Qty: 15 TAB | Refills: 0 | Status: SHIPPED | OUTPATIENT
Start: 2018-09-28 | End: 2018-10-05

## 2018-09-28 RX ORDER — PSEUDOEPHEDRINE HCL 30 MG
100 TABLET ORAL 2 TIMES DAILY PRN
Qty: 60 CAP | Refills: 1 | Status: SHIPPED | OUTPATIENT
Start: 2018-09-28

## 2018-09-28 RX ORDER — FERROUS SULFATE 325(65) MG
325 TABLET ORAL 3 TIMES DAILY
Qty: 90 TAB | Refills: 0 | Status: SHIPPED | OUTPATIENT
Start: 2018-09-28

## 2018-09-28 RX ADMIN — Medication 1 TABLET: at 06:12

## 2018-09-28 RX ADMIN — OXYCODONE HYDROCHLORIDE AND ACETAMINOPHEN 1 TABLET: 5; 325 TABLET ORAL at 04:24

## 2018-09-28 RX ADMIN — IBUPROFEN 800 MG: 800 TABLET, FILM COATED ORAL at 13:27

## 2018-09-28 ASSESSMENT — PAIN SCALES - GENERAL
PAINLEVEL_OUTOF10: 0
PAINLEVEL_OUTOF10: 1
PAINLEVEL_OUTOF10: 4
PAINLEVEL_OUTOF10: 5
PAINLEVEL_OUTOF10: 0

## 2018-09-28 NOTE — PROGRESS NOTES
Received report from Clara GARVIN. Assumed patient care. Assessment complete. Pt stated pain at a 5; motrin and percocet 5 mg given. Pt has no c/o headache, dizziness, or nausea. Pt is walking with a steady gait. Pt is able to get herself up to the bathroom without assistance. Will continue postpartum care.

## 2018-09-28 NOTE — LACTATION NOTE
Follow-up visit. MAGALIE states she had just finished with breastfeeding, and has already supplemented infant with formula when this LC came in. MAGALIE denies any pain or problems when using HG pump. Encouraged her to follow up with her WIC office in [place] for outpatient lactation assistance as needed.

## 2018-09-28 NOTE — LACTATION NOTE
MOB feels supported in breastfeeding infant. Worked with her on calming infant by HE and spoon feeding infant when frantic for easier latch. Infant sleepy after spooon fed 5ml; placed in safe position and left skin to skin with teaching done on safety. Encouraged to call when infant shows hunger cues for assist before discharge. Grandma is supportive of breastfeeding and assists with latch as needed. MOB is aware of outpatient support for breastfeeding through [place] and the Breastfeeding circles. Breastfeeding POC: Breastfeeding on cue a minimum of 8x/24 hours and follow up with outpatient resources as needed.

## 2018-09-28 NOTE — PROGRESS NOTES
3542- Discharge education discussed with patient.  Patient verbalized understanding.  Prescriptions handed to patient.  Patient verbalized understanding of follow up appointments for herself and infant.

## 2018-09-28 NOTE — DISCHARGE INSTRUCTIONS
POSTPARTUM DISCHARGE INSTRUCTIONS FOR MOM    YOB: 1995   Age: 23 y.o.               Admit Date: 2018     Discharge Date: 2018  Attending Doctor:  Ariella Villaseñor M.D.                  Allergies:  Patient has no known allergies.    Discharged to home by car. Discharged via wheelchair, hospital escort: Yes.  Special equipment needed: Not Applicable  Belongings with: Personal  Be sure to schedule a follow-up appointment with your primary care doctor or any specialists as instructed.     Discharge Plan:   Influenza Vaccine Indication: Indicated: 9 to 64 years of age  Influenza Vaccine Given - only chart on this line when given: Influenza Vaccine Given (See MAR)    REASONS TO CALL YOUR OBSTETRICIAN:  1.   Persistent fever or shaking chills (Temperature higher than 100.4)  2.   Heavy bleeding (soaking more than 1 pad per hour); Passing clots  3.   Foul odor from vagina  4.   Mastitis (Breast infection; breast pain, chills, fever, redness)  5.   Urinary pain, burning or frequency  6.   Episiotomy infection  7.   Abdominal incision infection  8.   Severe depression longer than 24 hours    HAND WASHING  · Prior to handling the baby.  · Before breastfeeding or bottle feeding baby.  · After using the bathroom or changing the baby's diaper.    WOUND CARE  Ask your physician for additional care instructions.  In general:    ·  Incision:      · Keep clean and dry.    · Do NOT lift anything heavier than your baby for up to 6 weeks.    · There should not be any opening or pus.      VAGINAL CARE  · Nothing inside vagina for 6 weeks: no sexual intercourse, tampons or douching.  · Bleeding may continue for 2-4 weeks.  Amount may vary.    · Call your physician for heavy bleeding which means soaking more than 1 pad per hour    BIRTH CONTROL  · It is possible to become pregnant at any time after delivery and while breastfeeding.  · Plan to discuss a method of birth control with your physician at your follow  "up visit. visit.    DIET AND ELIMINATION  · Eating more fiber (bran cereal, fruits, and vegetables) and drinking plenty of fluids will help to avoid constipation.  · Urinary frequency after childbirth is normal.    POSTPARTUM BLUES  During the first few days after birth, you may experience a sense of the \"blues\" which may include impatience, irritability or even crying.  These feeling come and go quickly.  However, as many as 1 in 10 women experience emotional symptoms known as postpartum depression.    Postpartum depression:  May start as early as the second or third day after delivery or take several weeks or months to develop.  Symptoms of \"blues\" are present, but are more intense:  Crying spells; loss of appetite; feelings of hopelessness or loss of control; fear of touching the baby; over concern or no concern at all about the baby; little or no concern about your own appearance/caring for yourself; and/or inability to sleep or excessive sleeping.  Contact your physician if you are experiencing any of these symptoms.    Crisis Hotline:  · [place] Crisis Hotline:  3-266-OMFVDMR  Or 1-399.113.3091  · [place] Crisis Hotline:  1-669.838.9255  Or 087-303-7552    PREVENTING SHAKEN BABY:  If you are angry or stressed, PUT THE BABY IN THE CRIB, step away, take some deep breaths, and wait until you are calm to care for the baby.  DO NOT SHAKE THE BABY.  You are not alone, call a supporter for help.    · Crisis Call Center 24/7 crisis line 318-885-2715 or 1-661.936.4300  · You can also text them, text \"ANSWER\" to 690247    QUIT SMOKING/TOBACCO USE:  I understand the use of any tobacco products increases my chance of suffering from future heart disease and could cause other illnesses which may shorten my life. Quitting the use of tobacco products is the single most important thing I can do to improve my health. For further information on smoking / tobacco cessation call a Toll Free Quit Line at 1-688.686.2137 (*National " Cancer Register) or 1-883.798.4219 (American Lung Association) or you can access the web based program at www.lungusa.org.    · [place] Tobacco Users Help Line:  (801) 713-9193       Toll Free: 1-132.549.6280  · Quit Tobacco Program [facility] (025)849-5516    DEPRESSION / SUICIDE RISK:  As you are discharged from this [facility], it is important to learn how to keep safe from harming yourself.    Recognize the warning signs:  · Abrupt changes in personality, positive or negative- including increase in energy   · Giving away possessions  · Change in eating patterns- significant weight changes-  positive or negative  · Change in sleeping patterns- unable to sleep or sleeping all the time   · Unwillingness or inability to communicate  · Depression  · Unusual sadness, discouragement and loneliness  · Talk of wanting to die  · Neglect of personal appearance   · Rebelliousness- reckless behavior  · Withdrawal from people/activities they love  · Confusion- inability to concentrate     If you or a loved one observes any of these behaviors or has concerns about self-harm, here's what you can do:  · Talk about it- your feelings and reasons for harming yourself  · Remove any means that you might use to hurt yourself (examples: pills, rope, extension cords, firearm)  · Get professional help from the community (Mental Health, Substance Abuse, psychological counseling)  · Do not be alone:Call your Safe Contact- someone whom you trust who will be there for you.  · Call your local CRISIS HOTLINE 038-3091 or 671-460-1232  · Call your local Children's Mobile Crisis Response Team [place] (268) 749-3424 or www.OralWise  · Call the toll free National Suicide Prevention Hotlines   · National Suicide Prevention Lifeline 901-066-OXAI (4900)  · National Hope Line Network 800-SUICIDE (070-4812)    DISCHARGE SURVEY:  Thank you for choosing [facility].  We hope we provided you with very good  care.  You may be receiving a survey in the mail.  Please fill it out.  Your opinion is valuable to us.    ADDITIONAL EDUCATIONAL MATERIALS GIVEN TO PATIENT:        My signature on this form indicates that:  1.  I have reviewed and understand the above information  2.  My questions regarding this information have been answered to my satisfaction.  3.  I have formulated a plan with my discharge nurse to obtain my prescribed medication for home.

## 2018-09-28 NOTE — LACTATION NOTE
This note was copied from a baby's chart.  MOB called for latch support. Infant alert and rooting- review positioning and latch technique. Latch achieved with ease and rapidly. Discharging to home today with supportive grandma and info previously given for outpatient resources in support of lactation.

## 2018-11-06 ENCOUNTER — HOSPITAL ENCOUNTER (OUTPATIENT)
Facility: MEDICAL CENTER | Age: 23
End: 2018-11-06
Attending: NURSE PRACTITIONER
Payer: COMMERCIAL

## 2018-11-06 ENCOUNTER — POST PARTUM (OUTPATIENT)
Dept: OBGYN | Facility: CLINIC | Age: 23
End: 2018-11-06

## 2018-11-06 VITALS — SYSTOLIC BLOOD PRESSURE: 110 MMHG | WEIGHT: 171 LBS | DIASTOLIC BLOOD PRESSURE: 80 MMHG | BODY MASS INDEX: 32.31 KG/M2

## 2018-11-06 PROCEDURE — 90050 PR POSTPARTUM VISIT: CPT | Performed by: NURSE PRACTITIONER

## 2018-11-06 RX ORDER — ACETAMINOPHEN AND CODEINE PHOSPHATE 120; 12 MG/5ML; MG/5ML
1 SOLUTION ORAL DAILY
Qty: 28 TAB | Refills: 11 | Status: SHIPPED | OUTPATIENT
Start: 2018-11-06

## 2018-11-06 RX ORDER — NORGESTIMATE AND ETHINYL ESTRADIOL 0.25-0.035
1 KIT ORAL DAILY
Qty: 28 TAB | Refills: 11 | Status: SHIPPED | OUTPATIENT
Start: 2018-11-06 | End: 2018-11-06

## 2018-11-06 NOTE — PROGRESS NOTES
Subjective:      Sugar Ding is a 23 y.o. female who presents with No chief complaint on file.            HPI    ROS       Objective:     /80   Wt 77.6 kg (171 lb)   LMP 01/01/2018   BMI 32.31 kg/m²      Physical Exam   Constitutional: She is oriented to person, place, and time. She appears well-developed and well-nourished.   HENT:   Head: Normocephalic.   Eyes: Pupils are equal, round, and reactive to light.   Neck: Normal range of motion. No thyromegaly present.   Cardiovascular: Normal rate, regular rhythm and normal heart sounds.    Pulmonary/Chest: Effort normal and breath sounds normal.   Abdominal: Soft. Bowel sounds are normal.   Genitourinary: Vagina normal and uterus normal.   Musculoskeletal: Normal range of motion.   Neurological: She is oriented to person, place, and time.   Skin: Skin is warm and dry.   Psychiatric: She has a normal mood and affect. Her behavior is normal. Judgment and thought content normal.   Nursing note and vitals reviewed.              Assessment/Plan:     1. Postpartum examination following vaginal delivery    - THINPREP RFLX HPV ASCUS W/CTNG; Future  Micronor to pharmacy

## 2018-11-06 NOTE — PROGRESS NOTES
Pt here today for postpartum exam.  Delivery Date 9/26/18   Currently: both breast and bottle feeding   BCM: Pt would like to discuss options, information given on [facility] and [facility].   Good ph:[phone]  Pt states she is unsure if she started her period and wants to know if its her period or blood from after giving birth.  Chaperone offered and not indicated  PAP today.

## 2018-11-06 NOTE — PROGRESS NOTES
Subjective   Subjective:    Sugar Ding is a 23 y.o. female who presents for her postpartum exam s/p  with 3rd degree laceration. Her prenatal course was uncomplicated. Her postpartum course was also uncomplicated. She denies dysuria, vaginal bleeding, odor, itching or breast problems. She is both breast and bottle feeding. She desires an OCP for her birth control method. Reports not having sex prior to this appointment. Eating a regular diet without difficulty. Bowel movement are Normal.  The patient is not having any pain. No current menses. Patient Denies Incisional pain, drainage or redness. Patient denies postpartum depression. She continued iron supplementation after delivery.    Problem List     Patient Active Problem List    Diagnosis Date Noted   • Postpartum examination following vaginal delivery 2018   •  (normal spontaneous vaginal delivery) 2018   • Postpartum care and examination of lactating mother 2018       Objective    See PE  Lab: H&H upon  Discharge 7.9/24.0  Weight - 171 lb  Vitals - 110/80    Assessment   Assessment:    1. PP care of lactating women   2. Exam WNL   3. Pap WNL done today   4. Desires contraception     Plan   Plan:    1. Breastfeeding support   2. Continue PNV   3. Contraceptive counseling - micronor with start up instructions to pharmacy.   4. Encouraged condom use for contraceptive start up.   5. Discussed diet, exercise and resumption of sexual activity   6. Preconception guidance for next pregnancy if applicable. No specific risk factors. Folic acid for all women of childbearing age.   7.  Follow up needed - continue annual gyn  8.  Smoking/etoh/drug screening - no exposure.

## 2018-11-08 LAB
C TRACH DNA GENITAL QL NAA+PROBE: NEGATIVE
CYTOLOGY REG CYTOL: NORMAL
N GONORRHOEA DNA GENITAL QL NAA+PROBE: NEGATIVE
SPECIMEN SOURCE: NORMAL

## 2021-12-22 NOTE — DISCHARGE SUMMARY
Discharge Summary:      Sugar Ding      Admit Date:   2018  Discharge Date:  2018     Admitting diagnosis:  Pregnancy  Labor and delivery, indication for care  Discharge Diagnosis: Status post vaginal, spontaneous.  Pregnancy Complications: none  Tubal Ligation:  N\A        History:  History reviewed. No pertinent past medical history.  OB History    Para Term  AB Living   1 1 1     1   SAB TAB Ectopic Molar Multiple Live Births           0 1      # Outcome Date GA Lbr [place]/2nd Weight Sex Delivery Anes PTL Lv   1 Term 18 40w5d 10:48 / 03:20 3.58 kg (7 lb 14.3 oz) F Vag-Spont Local, EPI N NYA           Patient has no known allergies.  Patient Active Problem List    Diagnosis Date Noted   •  (normal spontaneous vaginal delivery) 2018   • Postpartum care and examination of lactating mother 2018        Hospital Course:   23 y.o. , now para 1, was admitted with the above mentioned diagnosis, underwent Induction of Labor, vaginal, spontaneous. Patient postpartum course was unremarkable, with progressive advancement in diet , ambulation and toleration of oral analgesia. Patient without complaints today and desires discharge.      Vitals:    18 1600 18 2000 18 0000 18 0400   BP: 116/77 137/92 127/83 122/80   Pulse: 98 (!) 101 95 84   Resp:    Temp: 36.9 °C (98.5 °F) 37.2 °C (98.9 °F) 36.6 °C (97.8 °F) 37.1 °C (98.8 °F)   TempSrc:       SpO2: 97% 95% 97% 97%   Weight:       Height:           Current Facility-Administered Medications   Medication Dose   • oxytocin (PITOCIN) infusion (for postpartum)   mL/hr   • oxyCODONE-acetaminophen (PERCOCET-10)  MG per tablet 1 Tab  1 Tab   • ibuprofen (MOTRIN) tablet 800 mg  800 mg   • diphenoxylate-atropine (LOMOTIL) 2.5-0.025 MG per tablet 1 Tab  1 Tab   • LR infusion     • PRN oxytocin (PITOCIN) (20 Units/1000 mL) PRN for excessive uterine bleeding - See Admin Instr   Chief Complaint   Patient presents with   • Sore Throat       HISTORY OF PRESENT ILLNESS:  Pedro Luis Francis is a 30 year old male who comes in today complaining of symptoms of gradual onset of Sore Throat   that started 4 days ago. He reports that symptoms have worsened. Associated symptoms include sore throat, headache, nonproductive cough, postnasal drip. For symptom relief patient has tried Nyquil, Dayquil and Halls which has provided mild relief.. This patient is drinking plenty of fluids. They has not had a recent close exposure to someone with proven streptococcal pharyngitis. He was tested 2 days ago for covid and it was negative. He was exposed the day he was tested.        Pedro Luis Francis currently has no medications in their medication list.  ALLERGIES:  No Known Allergies  Pedro Luis Francis  has no history on file for tobacco use.     REVIEW OF SYSTEMS:   All systems reviewed and negative except for those mentioned in the history of present illness    PHYSICAL EXAMINATION:  Visit Vitals  /78 (Cuff Size: Large Adult)   Pulse (!) 103   Temp 99.3 °F (37.4 °C) (Oral)   SpO2 97%     General: alert, in no distress, cooperative  Skin: Warm and dry with no lesions or rashes.:  none  Lymphatic: No lymphadenopathy  Ears: External ears normal. Canals clear. Tympanic membranes are clear with all landmarks noted.   Nose: nares patent, nares congested and nares with clear discharge  Mouth/Throat: erythematous  Tonsils: 1+ Right and 1+ Left  Sinuses: nontender  Heart: regular rate and rhythm  Lungs: lungs are clear to auscultation      I ran the POC Strep twice and both were X on Aubrie.  I will send a Throat culture instead.  Pt agrees to plan.    Results for orders placed or performed in visit on 12/22/21   POCT RAPID STREP A   Result Value    GRP A STREP Negative    Internal Procedural Controls Acceptable No         Assessment:  1. Sore throat          Plan:  Orders Placed This Encounter   • 2019 Novel Coronavirus  (SARS-CoV-2)   • POCT Rapid strep A   • STREPTOCOCCUS GROUP A (STREPTOCOCCUS PYOGENES), BACTERIAL CULTURE       - Vigilant handwashing to prevent transmission of infection.   - Plenty of fluids to avoid dehydration.   - Plenty of rest.   - Patient agrees to follow up with primary doctor or return to Urgent Care if symptoms persist or suddenly worsen after 1-2 days of improvement.   - Patient agrees to visit Emergency Room immediately with problems swallowing, problems breathing, chest pain, severe headache, or vision changes.   - Patient verbalizes understanding and agreement of the plan as documented in the patient instructions.    Symptom Management Recommended: Gargle with warm salt water, lozenges/cough drops, Advil/Tylenol as directed on package. Increase fluid intake and rest. Humidifier in room at night.     Patient education materials given.  Patient to follow up with primary care provider or Urgent Care if symptoms worsen or fail to improve.    MYLA Mejia     125-999 mL/hr   • miSOPROStol (CYTOTEC) tablet 600 mcg  600 mcg   • docusate sodium (COLACE) capsule 100 mg  100 mg   • carboPROST (HEMABATE) injection 250 mcg  250 mcg   • prenatal plus vitamin (STUARTNATAL 1+1) 27-1 MG tablet 1 Tab  1 Tab   • acetaminophen (TYLENOL) tablet 650 mg  650 mg   • oxyCODONE-acetaminophen (PERCOCET) 5-325 MG per tablet 1 Tab  1 Tab   • ondansetron (ZOFRAN ODT) dispertab 4 mg  4 mg    Or   • ondansetron (ZOFRAN) syringe/vial injection 4 mg  4 mg   • magnesium sulfate 20 g/500mL infusion  2 g/hr   • oxytocin (PITOCIN) 20 UNITS/1000ML LR (induction of labor)  0.5-20 edmundo-units/min   • ropivacaine (NAROPIN) injection         Exam:  Breast Exam: negative  Abdomen: Abdomen soft, non-tender. BS normal. No masses,  No organomegaly  Fundus Non Tender: yes  Incision: none  Perineum: 3rd degree perineal laceration, well approximated and healing well  Extremity: extremities, peripheral pulses and reflexes normal, no edema, redness or tenderness in the calves or thighs, Homans sign is negative, no sign of DVT, feet normal, good pulses, normal color, temperature and sensation     Labs:  Recent Labs      09/25/18   1500  09/27/18   0314   WBC  12.7*  23.0*   RBC  4.22  2.84*   HEMOGLOBIN  11.8*  7.9*   HEMATOCRIT  35.6*  24.0*   MCV  84.4  84.5   MCH  28.0  27.1   MCHC  33.1*  32.1*   RDW  45.1  46.3   PLATELETCT  245  204   MPV  12.4  11.7        Activity:   Discharge to home  Pelvic Rest x 6 weeks    Assessment:  normal postpartum course  Discharge Assessment: Taking adequate diet and fluids. Voiding without difficulty, no heavy bleeding or foul discharge noted     Follow up: .[facility] or [facility] in 5 weeks for vaginal     Discharge Meds:   Current Outpatient Prescriptions   Medication Sig Dispense Refill   • oxyCODONE-acetaminophen (PERCOCET) 5-325 MG Tab Take 1-2 Tabs by mouth every four hours as needed for up to 7 days. 15 Tab 0   • ibuprofen (MOTRIN) 800 MG Tab Take 1 Tab by mouth every  8 hours as needed for Mild Pain or Moderate Pain. 30 Tab 0   • docusate sodium 100 MG Cap Take 100 mg by mouth 2 times a day as needed for Constipation. 60 Cap 1     Pt need to RT [facility] or ER if any of the following occur:  Fever over 100,5  Severe abd pain  Red streaks or painful masses in the breasts  Foul smelling d/c or lochia  Heavy vaginal bleeding saturating a pad per hour  S/s of PP depression    Desires Depo provera for BCM    HUONG JenningsN.M.

## 2023-07-19 ENCOUNTER — OFFICE VISIT (OUTPATIENT)
Dept: URGENT CARE | Facility: PHYSICIAN GROUP | Age: 28
End: 2023-07-19

## 2023-07-19 VITALS
DIASTOLIC BLOOD PRESSURE: 64 MMHG | SYSTOLIC BLOOD PRESSURE: 126 MMHG | HEART RATE: 81 BPM | WEIGHT: 157 LBS | RESPIRATION RATE: 18 BRPM | TEMPERATURE: 98.8 F | HEIGHT: 60 IN | OXYGEN SATURATION: 100 % | BODY MASS INDEX: 30.82 KG/M2

## 2023-07-19 DIAGNOSIS — R55 SYNCOPE, UNSPECIFIED SYNCOPE TYPE: ICD-10-CM

## 2023-07-19 PROCEDURE — 3074F SYST BP LT 130 MM HG: CPT | Performed by: NURSE PRACTITIONER

## 2023-07-19 PROCEDURE — 99205 OFFICE O/P NEW HI 60 MIN: CPT | Performed by: NURSE PRACTITIONER

## 2023-07-19 PROCEDURE — 3078F DIAST BP <80 MM HG: CPT | Performed by: NURSE PRACTITIONER

## 2023-07-20 NOTE — PROGRESS NOTES
Patient has consented to treatment and for use of patient information for treatment and billing purposes.    Date: 07/19/23     Arrival Mode: Private Vehicle    Chief Complaint:    Chief Complaint   Patient presents with    Other     Pt. States yesterday she passed out  and can't remember much.        History of Present Illness: 28 y.o.  female presents to clinic pain that yesterday around 5 PM she passed out.  Patient states she does not remember the events events were witnessed by her 2 sisters who did not tell her what happened.  Patient states she was holding a plate of food she looked at her sister and told her sister that her head felt really painfull and a lot of pressure.  She reports her sister said then she did fall to the ground face down.  She did urinate on herself.  Her sister did roll her on her side and states that her eyes were rolled in the back of her head and she was very stiff.  They are unsure how long she passed out.  She did not vomit.  She reports afterwards she was tired and she did go lay down.  No previous history of recent trauma.  Currently she has a mild headache she denies any dizziness or vision changes.  No nausea no vomiting or diarrhea.  Upon questioning patient states she does have a history of passing out when she was 15.  She passed out several times.  She states that time they attributed her passing out to a previous anoxic brain injury as a child where she did have to have brain surgery.  Patient was unable to elaborate exactly what type of surgery or anoxic brain injury she had.  No history of seizures.  Patient states she did have 1 drink of alcohol and denies that she was drunk.      ROS:    As stated in HPI     Pertinent Medical History:  No past medical history on file.     Pertinent Surgical History:  No past surgical history on file.     Pertinent Medications:    Current Outpatient Medications on File Prior to Visit   Medication Sig Dispense Refill    norethindrone  (MICRONOR) 0.35 MG tablet Take 1 Tab by mouth every day. (Patient not taking: Reported on 7/19/2023) 28 Tab 11    ibuprofen (MOTRIN) 800 MG Tab Take 1 Tab by mouth every 8 hours as needed for Mild Pain or Moderate Pain. (Patient not taking: Reported on 7/19/2023) 30 Tab 0    docusate sodium 100 MG Cap Take 100 mg by mouth 2 times a day as needed for Constipation. (Patient not taking: Reported on 7/19/2023) 60 Cap 1    ferrous sulfate 325 (65 Fe) MG tablet Take 1 Tab by mouth 3 times a day. (Patient not taking: Reported on 7/19/2023) 90 Tab 0    Prenatal MV-Min-Fe Fum-FA-DHA (PRENATAL 1 PO) Take  by mouth. (Patient not taking: Reported on 7/19/2023)       No current facility-administered medications on file prior to visit.        Allergies:    Patient has no known allergies.     Social History:  Social History     Tobacco Use    Smoking status: Never    Smokeless tobacco: Never   Vaping Use    Vaping Use: Never used   Substance Use Topics    Alcohol use: Yes    Drug use: No        No LMP recorded.           Physical Exam:    Vitals:    07/19/23 1851   BP: 126/64   Pulse: 81   Resp: 18   Temp: 37.1 °C (98.8 °F)   SpO2: 100%             Physical Exam  Constitutional:       General: She is not in acute distress.     Appearance: Normal appearance. She is well-developed and normal weight. She is not ill-appearing, toxic-appearing or diaphoretic.   HENT:      Head: Normocephalic and atraumatic.   Eyes:      General: Lids are normal. Gaze aligned appropriately. No allergic shiner or scleral icterus.     Extraocular Movements: Extraocular movements intact.      Conjunctiva/sclera: Conjunctivae normal.   Cardiovascular:      Rate and Rhythm: Normal rate and regular rhythm.      Pulses:           Radial pulses are 2+ on the right side and 2+ on the left side.      Heart sounds: Normal heart sounds.   Pulmonary:      Effort: Pulmonary effort is normal.      Breath sounds: Normal breath sounds and air entry. No decreased breath  sounds, wheezing, rhonchi or rales.   Abdominal:      General: Abdomen is flat. Bowel sounds are normal.      Palpations: Abdomen is soft.      Tenderness: There is no abdominal tenderness.   Musculoskeletal:      Right lower leg: No edema.      Left lower leg: No edema.   Skin:     General: Skin is warm.      Capillary Refill: Capillary refill takes less than 2 seconds.      Coloration: Skin is not cyanotic or pale.   Neurological:      Mental Status: She is alert and oriented to person, place, and time.      Gait: Gait is intact.   Psychiatric:         Behavior: Behavior normal. Behavior is cooperative.            Diagnostics:    None      Medical Decision making and clinic course :  I personally reviewed prior external notes and test results pertinent to today's visit. Shared decision-making was utilized with patient for treatment plan.    28-year-old female presenting to clinic status post syncopal episode approximate ago.  She did urinate on herself and did have increased fatigue afterwards as well as retrograde amnesia.  Did discuss differentials with patient to include possible seizures, CVA cardiac etiology of electrolyte abnormality, aneurysms or injury due to previous anoxic brain injury.  Thus that her symptoms are concerning and may be life-threatening and recommend she seek higher level care.  Patient is agreeable to seek high-level care via private car reports she will go to [facility].  Driven by friend    1. Syncope, unspecified syncope type        Disposition:    To er via private car driven by friend     Voice Recognition Disclaimer:  Portions of this document were created using voice recognition software. The software does have a chance of producing errors of grammar and possibly content. I have made every reasonable attempt to correct obvious errors, but there may be errors of grammar and possibly content that I did not discover before finalizing the documentation.    Nae Reid,  REGINO

## 2025-02-22 ENCOUNTER — HOSPITAL ENCOUNTER (EMERGENCY)
Facility: MEDICAL CENTER | Age: 30
End: 2025-02-22
Attending: EMERGENCY MEDICINE
Payer: OTHER MISCELLANEOUS

## 2025-02-22 ENCOUNTER — APPOINTMENT (OUTPATIENT)
Dept: RADIOLOGY | Facility: MEDICAL CENTER | Age: 30
End: 2025-02-22
Attending: EMERGENCY MEDICINE
Payer: OTHER MISCELLANEOUS

## 2025-02-22 VITALS
DIASTOLIC BLOOD PRESSURE: 91 MMHG | SYSTOLIC BLOOD PRESSURE: 117 MMHG | HEART RATE: 82 BPM | HEIGHT: 62 IN | WEIGHT: 173.06 LBS | TEMPERATURE: 98.8 F | RESPIRATION RATE: 18 BRPM | OXYGEN SATURATION: 97 % | BODY MASS INDEX: 31.85 KG/M2

## 2025-02-22 DIAGNOSIS — M77.9 TENDINITIS: ICD-10-CM

## 2025-02-22 DIAGNOSIS — M25.532 LEFT WRIST PAIN: ICD-10-CM

## 2025-02-22 PROCEDURE — 73110 X-RAY EXAM OF WRIST: CPT | Mod: LT

## 2025-02-22 PROCEDURE — 73090 X-RAY EXAM OF FOREARM: CPT | Mod: LT

## 2025-02-22 PROCEDURE — 99283 EMERGENCY DEPT VISIT LOW MDM: CPT

## 2025-02-22 RX ORDER — NAPROXEN 500 MG/1
500 TABLET ORAL 2 TIMES DAILY WITH MEALS
Qty: 60 TABLET | Refills: 0 | Status: SHIPPED | OUTPATIENT
Start: 2025-02-22

## 2025-02-22 NOTE — ED NOTES
Registration states that the issue has been resolved. Patient ambulated out of emergency department with all belongings and steady gait.

## 2025-02-22 NOTE — ED NOTES
Discharge paperwork provided. All questions and concerns addressed by Dr. Hartley. Registration contacted regarding error in worker's compensation paperwork.

## 2025-02-22 NOTE — LETTER
"    EMPLOYEE’S CLAIM FOR COMPENSATION/ REPORT OF INITIAL TREATMENT  FORM C-4  PLEASE TYPE OR PRINT    EMPLOYEE’S CLAIM - PROVIDE ALL INFORMATION REQUESTED   First Name                    SAIDA Wooten                  Last Name  Shena Ding Birthdate                    1995                Sex  Female Claim Number (Insurer’s Use Only)     Home Address  [address]    Age  29 y.o. 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[employer]   [employer]  Employee's Occupation (Job Title) When Injury or Occupational Disease Occurred      Employer's Name/Company Name    [employer]  Telephone   [phone]   Office Mail Address (Number and Street)    [address] [place] [address]   Date of Injury (if applicable) 2/13/2025               Hours Injury (if applicable)  3:00 PM Date Employer Notified  2/14/2025 Last Day of Work after Injury or Occupational Disease  2/21/2025 Supervisor to Whom Injury Reported  [place]   Address or Location of Accident (if applicable)  Work [1]   What were you doing at the time of accident? (if applicable)  I was lifting a box    How did this injury or occupational disease occur? (Be specific and answer in detail. Use additional sheet if necessary)  I was at work and I notice when i was packing and lifting a box, wrist was hurting   If you believe that you have an occupational disease, when did you first have knowledge of the disability and its relationship to your employment?  N/A Witnesses to the Accident (if applicable)  n/a      Nature of Injury or Occupational Disease  Strain  Part(s) of Body Injured or Affected  Lower " Arm (L) Upper Arm (L) Wrist (L) and Hand (L)    I CERTIFY THAT THE ABOVE IS TRUE AND CORRECT TO T HE BEST OF MY KNOWLEDGE AND THAT I HAVE PROVIDED THIS INFORMATION IN ORDER TO OBTAIN THE BENEFITS OF [place] OR [place]  I HEREBY AUTHORIZE ANY PHYSICIAN, CHIROPRACTOR, SURGEON, PRACTITIONER OR ANY OTHER PERSON, ANY HOSPITAL, INCLUDING [facility] OR [facility], ANY  MEDICAL SERVICE ORGANIZATION, ANY INSURANCE COMPANY, OR OTHER INSTITUTION OR ORGANIZATION TO RELEASE TO EACH OTHER, ANY MEDICAL OR OTHER INFORMATION, INCLUDING BENEFITS PAID OR PAYABLE, PERTINENT TO THIS INJURY OR DISEASE, EXCEPT INFORMATION RELATIVE TO DIAGNOSIS, TREATMENT AND/OR COUNSELING FOR AIDS, PSYCHOLOGICAL CONDITIONS, ALCOHOL OR CONTROLLED SUBSTANCES, FOR WHICH I MUST GIVE SPECIFIC AUTHORIZATION.  A PHOTOSTAT OF THIS AUTHORIZATION SHALL BE VALID AS THE ORIGINAL.     Date 02/22/2025   Place [facility]  Employee’s Original or  *Electronic Signature   THIS REPORT MUST BE COMPLETED AND MAILED WITHIN 3 WORKING DAYS OF TREATMENT   Place  [facility], EMERGENCY DEPT    Name of Facility   [facility]   Date 2/22/2025 Diagnosis and Description of Injury or Occupational Disease  (M25.532) Left wrist pain  (M77.9) Tendinitis  Diagnoses of Left wrist pain and Tendinitis were pertinent to this visit. Is there evidence that the injured employee was under the influence of alcohol and/or another controlled substance at the time of accident?  []No  [] Yes (if yes, please explain)   Hour 11:24am  No   Treatment: Physical exam, x-rays    Have you advised the patient to remain off work five days or more?   [] Yes  [] No        Yes           If yes, indicate dates: From   To      If no, is the injured employee capable of: [] full duty No   [] modified duty No  If yes to modified duty, specify any limitations / restrictions:   Patient cannot return  to work until cleared by occupational health   X-Ray Findings: Negative    From information given by the employee, together with medical evidence, can you directly connect this injury or occupational disease as job incurred?  []Yes   [] No Yes    Is additional medical care by a physician indicated? []Yes [] No  Yes  Comments:Needs follow-up with occupational health to be cleared back to work    Do you know of any previous injury or disease contributing to this condition or occupational disease? []Yes [] No (Explain if yes)                          No   Date  2/22/2025 Print Health Care Provider’s Name  Cesilia Hartley  I certify that the employer’s copy of  this form was delivered to the employer on:   Address   [address] [facility] [address] [facility]  INSURER'S USE ONLY                       [place]   [address] Provider’s Tax ID Number   [id number]   Telephone  Dept: [phone]    Health Care Provider’s Original or Electronic Signature  e-SignCESILIA HARTLEY M.D. Degree (MD,DO, DC,PA-C,APRN)  MD  Choose (if applicable)      ORIGINAL - TREATING HEALTHCARE PROVIDER PAGE 2 - INSURER/TPA PAGE 3 - EMPLOYER PAGE 4 - EMPLOYEE             Form C-4 (rev.08/23)

## 2025-02-22 NOTE — ED NOTES
Wrist immobilizer applied to injured extremity.  CMS checked before and after application. Pt educated on use and care. Pt verbalized understanding.

## 2025-02-22 NOTE — ED PROVIDER NOTES
ED Provider Note    Scribed for Cesilia Hartley M.D. by Tha Torres. 2/22/2025, 12:06 PM.    Primary care provider: Pcp Pt States None  Means of arrival: Walk-in  History obtained from: patient  History limited by: None    CHIEF COMPLAINT  Chief Complaint   Patient presents with    Hand Pain     Left radiates to forearm since thursday       HPI/ROS  Sugar Ding is a 29 y.o. female who presents to the Emergency Department for evaluation of left wrist and forearm pain onset one week ago. Patient reports that her pain is present in her left  wrist and forearm. Her pain is exacerbated with range of motion of her wrist. She does have to perform many repetitive movements with her left hand when working. She denies any chance of pregnancy.  She denies any trauma.    EXTERNAL RECORDS REVIEWED  None pertinent to today's encounter.      LIMITATION TO HISTORY   Select: : None    OUTSIDE HISTORIAN(S):  None      PAST MEDICAL HISTORY   None pertinent    SURGICAL HISTORY  patient denies any surgical history    SOCIAL HISTORY  Social History     Tobacco Use    Smoking status: Never    Smokeless tobacco: Never   Vaping Use    Vaping status: Never Used   Substance Use Topics    Alcohol use: Yes    Drug use: No      Social History     Substance and Sexual Activity   Drug Use No       FAMILY HISTORY  Family History   Problem Relation Age of Onset    No Known Problems Mother     No Known Problems Father     No Known Problems Sister     No Known Problems Brother        CURRENT MEDICATIONS  Home Medications       Reviewed by Opal Durant R.N. (Registered Nurse) on 02/22/25 at 1129  Med List Status: Not Addressed     Medication Last Dose Status   docusate sodium 100 MG Cap  Active   ferrous sulfate 325 (65 Fe) MG tablet  Active   ibuprofen (MOTRIN) 800 MG Tab  Active   norethindrone (MICRONOR) 0.35 MG tablet  Active   Prenatal MV-Min-Fe Fum-FA-DHA (PRENATAL 1 PO)  Active                    ALLERGIES  No Known  "Allergies    PHYSICAL EXAM  VITAL SIGNS: /82   Pulse 81   Temp 37.3 °C (99.2 °F) (Temporal)   Resp 16   Ht 1.575 m (5' 2\")   Wt 78.5 kg (173 lb 1 oz)   SpO2 98%   BMI 31.65 kg/m²   Nursing note and vitals reviewed.  Constitutional: Well-developed and well-nourished. No acute distress.  HENT: Head is normocephalic and atraumatic.  Eyes: extra-ocular movements intact  Cardiovascular: Regular rate and regular rhythm.  2+ bilateral radial pulses  Pulmonary/Chest: Normal respiratory effort  Musculoskeletal: Patient has full range of motion of bilateral wrist without difficulty, no swelling.  Full range of motion of bilateral elbows without swelling.  Patient has no point tenderness to palpation.  5 out of 5  strength in bilateral upper extremities  Neurological: Awake and alert, sensation intact in bilateral upper extremities  Skin: Skin is warm and dry. No rash.        DIAGNOSTIC STUDIES:    RADIOLOGY  Images independently interpreted by myself prior to radiologist review:  -X-rays demonstrate no acute bony pathology    Final interpretation by radiology demonstrates:    DX-WRIST-COMPLETE 3+ LEFT   Final Result      Normal left wrist radiography.      DX-FOREARM LEFT   Final Result      No acute osseous abnormality.        The radiologist's interpretation of all radiological studies have been reviewed by me.    COURSE & MEDICAL DECISION MAKING    INITIAL ASSESSMENT, ED COURSE AND PLAN    Patient is a 29-year-old female who presents for evaluation of left wrist and forearm pain.  Differential diagnosis includes overuse injury, tendinitis, bursitis, carpal tunnel syndrome.  Diagnostic workup includes x-rays of the left wrist and forearm.    Patient initial vitals are unremarkable.  X-rays returned and demonstrate no acute bony pathology.  Patient is advised likely overuse injury such as tendinitis versus possible carpal tunnel syndrome.  I will place her in a wrist splint and started on naproxen and have " her follow-up with occupational health for ongoing management.  Patient is amenable to this plan.  She is given strict return precautions and discharged in stable condition.      DISPOSITION AND DISCUSSIONS  I have discussed management of the patient with the following physicians and ROLY's:  none    Discussion of management with other Q or appropriate source(s): none     Escalation of care considered, and ultimately not performed:see above    Barriers to care at this time, including but not limited to: none.     Decision tools and prescription drugs considered including, but not limited to: see above.        FINAL IMPRESSION  1. Left wrist pain    2. Tendinitis          Tha NORTH (Scribe), viky scribing for, and in the presence of, Cesilia Hartley M.D..    Electronically signed by: Tha Torres (Joseluis), 2/22/2025    Cesilia NORTH M.D. personally performed the services described in this documentation, as scribed by Tha Torres in my presence, and it is both accurate and complete.    The note accurately reflects work and decisions made by me.  Cesilia Hartley M.D.  2/22/2025  2:17 PM

## 2025-02-22 NOTE — ED NOTES
Chief Complaint   Patient presents with    Hand Pain     Left radiates to forearm since thursday     Pt ambulated to triage c/o left hand -wrist pain since Thursday while at work. She denies trauma to hand , cms+.